# Patient Record
Sex: MALE | Race: BLACK OR AFRICAN AMERICAN | NOT HISPANIC OR LATINO
[De-identification: names, ages, dates, MRNs, and addresses within clinical notes are randomized per-mention and may not be internally consistent; named-entity substitution may affect disease eponyms.]

---

## 2017-01-13 ENCOUNTER — NON-APPOINTMENT (OUTPATIENT)
Age: 52
End: 2017-01-13

## 2017-01-13 ENCOUNTER — APPOINTMENT (OUTPATIENT)
Dept: INTERNAL MEDICINE | Facility: CLINIC | Age: 52
End: 2017-01-13

## 2017-01-13 VITALS
HEART RATE: 84 BPM | SYSTOLIC BLOOD PRESSURE: 110 MMHG | TEMPERATURE: 98.4 F | RESPIRATION RATE: 20 BRPM | DIASTOLIC BLOOD PRESSURE: 72 MMHG

## 2017-01-13 VITALS — BODY MASS INDEX: 35.89 KG/M2 | HEIGHT: 72 IN | WEIGHT: 265 LBS

## 2017-01-18 ENCOUNTER — OUTPATIENT (OUTPATIENT)
Dept: OUTPATIENT SERVICES | Facility: HOSPITAL | Age: 52
LOS: 1 days | Discharge: HOME | End: 2017-01-18

## 2017-01-18 VITALS
RESPIRATION RATE: 18 BRPM | WEIGHT: 265.44 LBS | SYSTOLIC BLOOD PRESSURE: 123 MMHG | TEMPERATURE: 98 F | HEART RATE: 84 BPM | DIASTOLIC BLOOD PRESSURE: 73 MMHG | OXYGEN SATURATION: 96 % | HEIGHT: 72 IN

## 2017-01-18 DIAGNOSIS — M17.11 UNILATERAL PRIMARY OSTEOARTHRITIS, RIGHT KNEE: ICD-10-CM

## 2017-01-18 DIAGNOSIS — Z01.818 ENCOUNTER FOR OTHER PREPROCEDURAL EXAMINATION: ICD-10-CM

## 2017-01-18 DIAGNOSIS — I48.91 UNSPECIFIED ATRIAL FIBRILLATION: ICD-10-CM

## 2017-01-18 DIAGNOSIS — M25.569 PAIN IN UNSPECIFIED KNEE: ICD-10-CM

## 2017-01-18 DIAGNOSIS — Z96.642 PRESENCE OF LEFT ARTIFICIAL HIP JOINT: Chronic | ICD-10-CM

## 2017-01-18 DIAGNOSIS — Z96.641 PRESENCE OF RIGHT ARTIFICIAL HIP JOINT: Chronic | ICD-10-CM

## 2017-01-18 DIAGNOSIS — Z98.89 OTHER SPECIFIED POSTPROCEDURAL STATES: Chronic | ICD-10-CM

## 2017-01-18 LAB
ANION GAP SERPL CALC-SCNC: 7 MMOL/L — SIGNIFICANT CHANGE UP (ref 5–17)
APTT BLD: 29.3 SEC — SIGNIFICANT CHANGE UP (ref 27.5–37.4)
BASOPHILS # BLD AUTO: 0 K/UL — SIGNIFICANT CHANGE UP (ref 0–0.2)
BASOPHILS NFR BLD AUTO: 0.5 % — SIGNIFICANT CHANGE UP (ref 0–2)
BUN SERPL-MCNC: 13 MG/DL — SIGNIFICANT CHANGE UP (ref 7–23)
CALCIUM SERPL-MCNC: 8.4 MG/DL — LOW (ref 8.5–10.1)
CHLORIDE SERPL-SCNC: 110 MMOL/L — HIGH (ref 96–108)
CO2 SERPL-SCNC: 28 MMOL/L — SIGNIFICANT CHANGE UP (ref 22–31)
CREAT SERPL-MCNC: 0.76 MG/DL — SIGNIFICANT CHANGE UP (ref 0.5–1.3)
EOSINOPHIL # BLD AUTO: 0 K/UL — SIGNIFICANT CHANGE UP (ref 0–0.5)
EOSINOPHIL NFR BLD AUTO: 1 % — SIGNIFICANT CHANGE UP (ref 0–6)
GLUCOSE SERPL-MCNC: 102 MG/DL — HIGH (ref 70–99)
HBA1C BLD-MCNC: 5 % — SIGNIFICANT CHANGE UP (ref 4–5.6)
HCT VFR BLD CALC: 40.4 % — SIGNIFICANT CHANGE UP (ref 39–50)
HGB BLD-MCNC: 13.8 G/DL — SIGNIFICANT CHANGE UP (ref 13–17)
INR BLD: 1.06 RATIO — SIGNIFICANT CHANGE UP (ref 0.88–1.16)
LYMPHOCYTES # BLD AUTO: 0.9 K/UL — LOW (ref 1–3.3)
LYMPHOCYTES # BLD AUTO: 24.4 % — SIGNIFICANT CHANGE UP (ref 13–44)
MCHC RBC-ENTMCNC: 27.6 PG — SIGNIFICANT CHANGE UP (ref 27–34)
MCHC RBC-ENTMCNC: 34.2 GM/DL — SIGNIFICANT CHANGE UP (ref 32–36)
MCV RBC AUTO: 80.7 FL — SIGNIFICANT CHANGE UP (ref 80–100)
MONOCYTES # BLD AUTO: 0.3 K/UL — SIGNIFICANT CHANGE UP (ref 0–0.9)
MONOCYTES NFR BLD AUTO: 8.2 % — SIGNIFICANT CHANGE UP (ref 2–14)
MRSA PCR RESULT.: SIGNIFICANT CHANGE UP
NEUTROPHILS # BLD AUTO: 2.5 K/UL — SIGNIFICANT CHANGE UP (ref 1.8–7.4)
NEUTROPHILS NFR BLD AUTO: 66 % — SIGNIFICANT CHANGE UP (ref 43–77)
PLATELET # BLD AUTO: 218 K/UL — SIGNIFICANT CHANGE UP (ref 150–400)
POTASSIUM SERPL-MCNC: 3.8 MMOL/L — SIGNIFICANT CHANGE UP (ref 3.5–5.3)
POTASSIUM SERPL-SCNC: 3.8 MMOL/L — SIGNIFICANT CHANGE UP (ref 3.5–5.3)
PROTHROM AB SERPL-ACNC: 11.9 SEC — SIGNIFICANT CHANGE UP (ref 10–13.1)
RBC # BLD: 5.01 M/UL — SIGNIFICANT CHANGE UP (ref 4.2–5.8)
RBC # FLD: 11.4 % — SIGNIFICANT CHANGE UP (ref 11–15)
S AUREUS DNA NOSE QL NAA+PROBE: SIGNIFICANT CHANGE UP
SODIUM SERPL-SCNC: 145 MMOL/L — SIGNIFICANT CHANGE UP (ref 135–145)
WBC # BLD: 3.7 K/UL — LOW (ref 3.8–10.5)
WBC # FLD AUTO: 3.7 K/UL — LOW (ref 3.8–10.5)

## 2017-01-18 RX ORDER — SODIUM CHLORIDE 9 MG/ML
3 INJECTION INTRAMUSCULAR; INTRAVENOUS; SUBCUTANEOUS EVERY 8 HOURS
Qty: 0 | Refills: 0 | Status: DISCONTINUED | OUTPATIENT
Start: 2017-01-30 | End: 2017-02-02

## 2017-01-18 NOTE — PATIENT PROFILE ADULT. - PSH
S/P arthroscopic knee surgery  Bilateral ( 10 years ago )  S/P hip replacement, left  September 2016  S/P hip replacement, right  May 2016

## 2017-01-18 NOTE — H&P PST ADULT - NSANTHOSAYNRD_GEN_A_CORE
No. SG screening performed.  STOP BANG Legend: 0-2 = LOW Risk; 3-4 = INTERMEDIATE Risk; 5-8 = HIGH Risk

## 2017-01-27 RX ORDER — OXYCODONE HYDROCHLORIDE 5 MG/1
20 TABLET ORAL ONCE
Qty: 0 | Refills: 0 | Status: DISCONTINUED | OUTPATIENT
Start: 2017-01-30 | End: 2017-02-02

## 2017-01-27 RX ORDER — ACETAMINOPHEN 500 MG
650 TABLET ORAL ONCE
Qty: 0 | Refills: 0 | Status: DISCONTINUED | OUTPATIENT
Start: 2017-01-30 | End: 2017-02-02

## 2017-01-27 RX ORDER — CELECOXIB 200 MG/1
200 CAPSULE ORAL ONCE
Qty: 0 | Refills: 0 | Status: DISCONTINUED | OUTPATIENT
Start: 2017-01-30 | End: 2017-02-02

## 2017-01-29 ENCOUNTER — RESULT REVIEW (OUTPATIENT)
Age: 52
End: 2017-01-29

## 2017-01-30 ENCOUNTER — INPATIENT (INPATIENT)
Facility: HOSPITAL | Age: 52
LOS: 2 days | Discharge: HOME | End: 2017-02-02
Attending: ORTHOPAEDIC SURGERY | Admitting: ORTHOPAEDIC SURGERY
Payer: MEDICAID

## 2017-01-30 ENCOUNTER — APPOINTMENT (OUTPATIENT)
Dept: ORTHOPEDIC SURGERY | Facility: HOSPITAL | Age: 52
End: 2017-01-30

## 2017-01-30 VITALS
HEART RATE: 87 BPM | WEIGHT: 264.55 LBS | RESPIRATION RATE: 18 BRPM | SYSTOLIC BLOOD PRESSURE: 150 MMHG | DIASTOLIC BLOOD PRESSURE: 81 MMHG | OXYGEN SATURATION: 98 % | TEMPERATURE: 98 F

## 2017-01-30 DIAGNOSIS — Z96.642 PRESENCE OF LEFT ARTIFICIAL HIP JOINT: Chronic | ICD-10-CM

## 2017-01-30 DIAGNOSIS — Z96.641 PRESENCE OF RIGHT ARTIFICIAL HIP JOINT: Chronic | ICD-10-CM

## 2017-01-30 DIAGNOSIS — Z98.89 OTHER SPECIFIED POSTPROCEDURAL STATES: Chronic | ICD-10-CM

## 2017-01-30 LAB
ANION GAP SERPL CALC-SCNC: 5 MMOL/L — SIGNIFICANT CHANGE UP (ref 5–17)
BUN SERPL-MCNC: 16 MG/DL — SIGNIFICANT CHANGE UP (ref 7–23)
CALCIUM SERPL-MCNC: 7.9 MG/DL — LOW (ref 8.5–10.1)
CHLORIDE SERPL-SCNC: 107 MMOL/L — SIGNIFICANT CHANGE UP (ref 96–108)
CO2 SERPL-SCNC: 32 MMOL/L — HIGH (ref 22–31)
CREAT SERPL-MCNC: 0.86 MG/DL — SIGNIFICANT CHANGE UP (ref 0.5–1.3)
GLUCOSE SERPL-MCNC: 96 MG/DL — SIGNIFICANT CHANGE UP (ref 70–99)
HCT VFR BLD CALC: 36.3 % — LOW (ref 39–50)
HGB BLD-MCNC: 12.5 G/DL — LOW (ref 13–17)
MCHC RBC-ENTMCNC: 28.8 PG — SIGNIFICANT CHANGE UP (ref 27–34)
MCHC RBC-ENTMCNC: 34.5 GM/DL — SIGNIFICANT CHANGE UP (ref 32–36)
MCV RBC AUTO: 83.5 FL — SIGNIFICANT CHANGE UP (ref 80–100)
PLATELET # BLD AUTO: 196 K/UL — SIGNIFICANT CHANGE UP (ref 150–400)
POTASSIUM SERPL-MCNC: 4.2 MMOL/L — SIGNIFICANT CHANGE UP (ref 3.5–5.3)
POTASSIUM SERPL-SCNC: 4.2 MMOL/L — SIGNIFICANT CHANGE UP (ref 3.5–5.3)
RBC # BLD: 4.35 M/UL — SIGNIFICANT CHANGE UP (ref 4.2–5.8)
RBC # FLD: 11.4 % — SIGNIFICANT CHANGE UP (ref 11–15)
SODIUM SERPL-SCNC: 144 MMOL/L — SIGNIFICANT CHANGE UP (ref 135–145)
WBC # BLD: 4 K/UL — SIGNIFICANT CHANGE UP (ref 3.8–10.5)
WBC # FLD AUTO: 4 K/UL — SIGNIFICANT CHANGE UP (ref 3.8–10.5)

## 2017-01-30 PROCEDURE — 27447 TOTAL KNEE ARTHROPLASTY: CPT | Mod: RT

## 2017-01-30 PROCEDURE — 88305 TISSUE EXAM BY PATHOLOGIST: CPT | Mod: 26

## 2017-01-30 PROCEDURE — 73560 X-RAY EXAM OF KNEE 1 OR 2: CPT | Mod: 26,RT

## 2017-01-30 PROCEDURE — 88311 DECALCIFY TISSUE: CPT | Mod: 26

## 2017-01-30 RX ORDER — SODIUM CHLORIDE 9 MG/ML
1000 INJECTION INTRAMUSCULAR; INTRAVENOUS; SUBCUTANEOUS
Qty: 0 | Refills: 0 | Status: DISCONTINUED | OUTPATIENT
Start: 2017-01-30 | End: 2017-02-02

## 2017-01-30 RX ORDER — OXYCODONE HYDROCHLORIDE 5 MG/1
10 TABLET ORAL EVERY 4 HOURS
Qty: 0 | Refills: 0 | Status: DISCONTINUED | OUTPATIENT
Start: 2017-01-30 | End: 2017-02-02

## 2017-01-30 RX ORDER — OXYCODONE HYDROCHLORIDE 5 MG/1
5 TABLET ORAL EVERY 4 HOURS
Qty: 0 | Refills: 0 | Status: DISCONTINUED | OUTPATIENT
Start: 2017-01-30 | End: 2017-02-02

## 2017-01-30 RX ORDER — ONDANSETRON 8 MG/1
4 TABLET, FILM COATED ORAL EVERY 6 HOURS
Qty: 0 | Refills: 0 | Status: DISCONTINUED | OUTPATIENT
Start: 2017-01-30 | End: 2017-02-02

## 2017-01-30 RX ORDER — CELECOXIB 200 MG/1
200 CAPSULE ORAL ONCE
Qty: 0 | Refills: 0 | Status: COMPLETED | OUTPATIENT
Start: 2017-01-30 | End: 2017-01-30

## 2017-01-30 RX ORDER — DIPHENHYDRAMINE HCL 50 MG
25 CAPSULE ORAL AT BEDTIME
Qty: 0 | Refills: 0 | Status: DISCONTINUED | OUTPATIENT
Start: 2017-01-30 | End: 2017-02-02

## 2017-01-30 RX ORDER — OXYCODONE HYDROCHLORIDE 5 MG/1
20 TABLET ORAL ONCE
Qty: 0 | Refills: 0 | Status: DISCONTINUED | OUTPATIENT
Start: 2017-01-30 | End: 2017-01-30

## 2017-01-30 RX ORDER — PANTOPRAZOLE SODIUM 20 MG/1
40 TABLET, DELAYED RELEASE ORAL DAILY
Qty: 0 | Refills: 0 | Status: DISCONTINUED | OUTPATIENT
Start: 2017-01-30 | End: 2017-02-02

## 2017-01-30 RX ORDER — ACETAMINOPHEN 500 MG
650 TABLET ORAL EVERY 6 HOURS
Qty: 0 | Refills: 0 | Status: DISCONTINUED | OUTPATIENT
Start: 2017-01-30 | End: 2017-02-02

## 2017-01-30 RX ORDER — SENNA PLUS 8.6 MG/1
2 TABLET ORAL AT BEDTIME
Qty: 0 | Refills: 0 | Status: DISCONTINUED | OUTPATIENT
Start: 2017-01-30 | End: 2017-02-02

## 2017-01-30 RX ORDER — PROCHLORPERAZINE MALEATE 5 MG
5 TABLET ORAL ONCE
Qty: 0 | Refills: 0 | Status: DISCONTINUED | OUTPATIENT
Start: 2017-01-30 | End: 2017-02-02

## 2017-01-30 RX ORDER — DOCUSATE SODIUM 100 MG
100 CAPSULE ORAL THREE TIMES A DAY
Qty: 0 | Refills: 0 | Status: DISCONTINUED | OUTPATIENT
Start: 2017-01-30 | End: 2017-02-02

## 2017-01-30 RX ORDER — POLYETHYLENE GLYCOL 3350 17 G/17G
17 POWDER, FOR SOLUTION ORAL DAILY
Qty: 0 | Refills: 0 | Status: DISCONTINUED | OUTPATIENT
Start: 2017-01-30 | End: 2017-02-02

## 2017-01-30 RX ORDER — SODIUM CHLORIDE 9 MG/ML
1000 INJECTION, SOLUTION INTRAVENOUS
Qty: 0 | Refills: 0 | Status: DISCONTINUED | OUTPATIENT
Start: 2017-01-30 | End: 2017-01-30

## 2017-01-30 RX ORDER — ACETAMINOPHEN 500 MG
650 TABLET ORAL ONCE
Qty: 0 | Refills: 0 | Status: COMPLETED | OUTPATIENT
Start: 2017-01-30 | End: 2017-01-30

## 2017-01-30 RX ORDER — MAGNESIUM HYDROXIDE 400 MG/1
30 TABLET, CHEWABLE ORAL DAILY
Qty: 0 | Refills: 0 | Status: DISCONTINUED | OUTPATIENT
Start: 2017-01-30 | End: 2017-02-02

## 2017-01-30 RX ORDER — RIVAROXABAN 15 MG-20MG
20 KIT ORAL DAILY
Qty: 0 | Refills: 0 | Status: DISCONTINUED | OUTPATIENT
Start: 2017-01-31 | End: 2017-02-02

## 2017-01-30 RX ORDER — HYDROMORPHONE HYDROCHLORIDE 2 MG/ML
0.5 INJECTION INTRAMUSCULAR; INTRAVENOUS; SUBCUTANEOUS
Qty: 0 | Refills: 0 | Status: DISCONTINUED | OUTPATIENT
Start: 2017-01-30 | End: 2017-02-02

## 2017-01-30 RX ORDER — CEFAZOLIN SODIUM 1 G
2000 VIAL (EA) INJECTION EVERY 8 HOURS
Qty: 0 | Refills: 0 | Status: COMPLETED | OUTPATIENT
Start: 2017-01-30 | End: 2017-01-31

## 2017-01-30 RX ORDER — MORPHINE SULFATE 50 MG/1
4 CAPSULE, EXTENDED RELEASE ORAL ONCE
Qty: 0 | Refills: 0 | Status: DISCONTINUED | OUTPATIENT
Start: 2017-01-30 | End: 2017-01-30

## 2017-01-30 RX ADMIN — SODIUM CHLORIDE 100 MILLILITER(S): 9 INJECTION INTRAMUSCULAR; INTRAVENOUS; SUBCUTANEOUS at 20:45

## 2017-01-30 RX ADMIN — Medication 100 MILLIGRAM(S): at 22:42

## 2017-01-30 RX ADMIN — Medication 1 TABLET(S): at 22:42

## 2017-01-30 RX ADMIN — MORPHINE SULFATE 4 MILLIGRAM(S): 50 CAPSULE, EXTENDED RELEASE ORAL at 19:00

## 2017-01-30 RX ADMIN — OXYCODONE HYDROCHLORIDE 20 MILLIGRAM(S): 5 TABLET ORAL at 10:46

## 2017-01-30 RX ADMIN — Medication 650 MILLIGRAM(S): at 10:45

## 2017-01-30 RX ADMIN — MORPHINE SULFATE 4 MILLIGRAM(S): 50 CAPSULE, EXTENDED RELEASE ORAL at 18:31

## 2017-01-30 RX ADMIN — Medication 100 MILLIGRAM(S): at 20:43

## 2017-01-30 RX ADMIN — OXYCODONE HYDROCHLORIDE 10 MILLIGRAM(S): 5 TABLET ORAL at 23:40

## 2017-01-30 RX ADMIN — CELECOXIB 200 MILLIGRAM(S): 200 CAPSULE ORAL at 10:44

## 2017-01-30 RX ADMIN — SODIUM CHLORIDE 75 MILLILITER(S): 9 INJECTION, SOLUTION INTRAVENOUS at 14:49

## 2017-01-30 RX ADMIN — OXYCODONE HYDROCHLORIDE 20 MILLIGRAM(S): 5 TABLET ORAL at 10:45

## 2017-01-30 RX ADMIN — OXYCODONE HYDROCHLORIDE 10 MILLIGRAM(S): 5 TABLET ORAL at 22:42

## 2017-01-30 RX ADMIN — CELECOXIB 200 MILLIGRAM(S): 200 CAPSULE ORAL at 10:46

## 2017-01-30 RX ADMIN — SODIUM CHLORIDE 3 MILLILITER(S): 9 INJECTION INTRAMUSCULAR; INTRAVENOUS; SUBCUTANEOUS at 22:38

## 2017-01-30 NOTE — DISCHARGE NOTE ADULT - CARE PLAN
Principal Discharge DX:	Knee osteoarthritis  Goal:	Reduce pain improve function  Instructions for follow-up, activity and diet:	1.	Pain Control  2.	Walking with full weight bearing as tolerated, with assistive devices (walker/Cane as Needed)  3.	DVT Prophylaxis for 30 days  4.	PT as needed  5.	Follow up with Dr. Hernandez as Outpatient in 10-14 Days after Discharge from the Hospital or Rehab. Call Office For Appointment.  6.	Remove Staples Post-Op Day 14, and Remove Dressing Post-Op Day 10, with Daily Dressing Changes as Need.  7.	Ice/Elevate affected area as Needed  8.	Keep Dressing Clean and dry. Principal Discharge DX:	Knee osteoarthritis  Goal:	Reduce pain improve function  Instructions for follow-up, activity and diet:	1.	Pain Control  2.	Walking with full weight bearing as tolerated, with assistive devices (walker/Cane as Needed)  3.	DVT Prophylaxis for 30 days  4.	PT as needed  5.	Follow up with Dr. Hernandez as Outpatient in 10-14 Days after Discharge from the Hospital or Rehab. Call Office For Appointment.  6.	Remove Staples Post-Op Day 14, and Remove Dressing Post-Op Day 10, with Daily Dressing Changes as Need.  7.	Ice/Elevate affected area as Needed  8.	Keep Dressing Clean and dry.  9.            F/u with Dr Kirkpatrick in 10-14 days

## 2017-01-30 NOTE — DISCHARGE NOTE ADULT - HOSPITAL COURSE
The patient is a 52 year old Male status post elective total knee Arthroplasty to the Right knee after failing outpatient nonoperative conservative management.  Patient presented to [facility] after being medically cleared for an elective surgical procedure. The patient was taken to the operating room on date mentioned above. Prophylactic antibiotics were started before the procedure and continued for 24 hours.  There were no complications during the procedure and patient tolerated the procedure well.  The patient was transferred to recovery room in stable condition and subsequently to surgical floor.  Patient was placed on Xarelto for anticoagulation.  All home medications were continued.  The patient received physical therapy daily and daily labs were followed.  The dressing was kept clean, dry, intact and changed on POD 3.  The rest of the hospital stay was unremarkable.

## 2017-01-30 NOTE — DISCHARGE NOTE ADULT - PLAN OF CARE
Reduce pain improve function 1.	Pain Control  2.	Walking with full weight bearing as tolerated, with assistive devices (walker/Cane as Needed)  3.	DVT Prophylaxis for 30 days  4.	PT as needed  5.	Follow up with Dr. Hernandez as Outpatient in 10-14 Days after Discharge from the Hospital or Rehab. Call Office For Appointment.  6.	Remove Staples Post-Op Day 14, and Remove Dressing Post-Op Day 10, with Daily Dressing Changes as Need.  7.	Ice/Elevate affected area as Needed  8.	Keep Dressing Clean and dry. 1.	Pain Control  2.	Walking with full weight bearing as tolerated, with assistive devices (walker/Cane as Needed)  3.	DVT Prophylaxis for 30 days  4.	PT as needed  5.	Follow up with Dr. Hernandez as Outpatient in 10-14 Days after Discharge from the Hospital or Rehab. Call Office For Appointment.  6.	Remove Staples Post-Op Day 14, and Remove Dressing Post-Op Day 10, with Daily Dressing Changes as Need.  7.	Ice/Elevate affected area as Needed  8.	Keep Dressing Clean and dry.  9.            F/u with Dr Kirkpatrick in 10-14 days

## 2017-01-30 NOTE — DISCHARGE NOTE ADULT - CARE PROVIDERS DIRECT ADDRESSES
,[email or web address] ([email or web address]

## 2017-01-30 NOTE — DISCHARGE NOTE ADULT - CARE PROVIDER_API CALL
Russ Hernandez), Orthopaedic Surgery  [address] [place] [address]  Phone: [phone]  Fax: [phone] Russ Hernandez), Orthopaedic Surgery  [address] [place] [address]  Phone: [phone]  Fax: [phone]    Matthew Kirkpatrick), Cardiology; Cardiovascular Disease  [address] [place] [address]  Phone: [phone]  Fax: [phone]

## 2017-01-30 NOTE — PROGRESS NOTE ADULT - SUBJECTIVE AND OBJECTIVE BOX
Pt S/E at bedside, patient reports numbness still from block in RLE.  AVSS  Gen: NAD, AAOx3    RLE:  Dressing clean dry intact  +EHL/FHL/TA/GS  Decreased sensation distal to knee  +DP/PT Pulses  Compartments soft  No calf TTP B/L

## 2017-01-30 NOTE — DISCHARGE NOTE ADULT - PATIENT PORTAL LINK FT
“You can access the Follow[employer] Patient Portal, offered by [facility], by registering with the following website: [email or web address]”

## 2017-01-30 NOTE — DISCHARGE NOTE ADULT - MEDICATION SUMMARY - MEDICATIONS TO TAKE
I will START or STAY ON the medications listed below when I get home from the hospital:    oxyCODONE-acetaminophen 5 mg-325 mg oral tablet  -- 1 tab(s) by mouth every 4 hours, As Needed -for severe pain MDD:6  -- Caution federal law prohibits the transfer of this drug to any person other  than the person for whom it was prescribed.  May cause drowsiness.  Alcohol may intensify this effect.  Use care when operating dangerous machinery.  This prescription cannot be refilled.  This product contains acetaminophen.  Do not use  with any other product containing acetaminophen to prevent possible liver damage.  Using more of this medication than prescribed may cause serious breathing problems.    -- Indication: For prn severe pain    Xarelto 20 mg oral tablet  -- 1 tab(s) by mouth once a day (in the evening)  -- Check with your doctor before becoming pregnant.  It is very important that you take or use this exactly as directed.  Do not skip doses or discontinue unless directed by your doctor.  Obtain medical advice before taking any non-prescription drugs as some may affect the action of this medication.  Take with food.    -- Indication: For home med/dvt ppx    Zofran 4 mg oral tablet  -- 1 tab(s) by mouth every 6 hours, As Needed for nausea.  -- Indication: For prn nausea/vommiting    Colace 100 mg oral capsule  -- 1 cap(s) by mouth 3 times a day, As Needed -for constipation MDD:3  -- Medication should be taken with plenty of water.    -- Indication: For prn constipation I will START or STAY ON the medications listed below when I get home from the hospital:    oxyCODONE-acetaminophen 5 mg-325 mg oral tablet  -- 1 tab(s) by mouth every 4 hours, As Needed -for severe pain MDD:6  -- Caution federal law prohibits the transfer of this drug to any person other  than the person for whom it was prescribed.  May cause drowsiness.  Alcohol may intensify this effect.  Use care when operating dangerous machinery.  This prescription cannot be refilled.  This product contains acetaminophen.  Do not use  with any other product containing acetaminophen to prevent possible liver damage.  Using more of this medication than prescribed may cause serious breathing problems.    -- Indication: For prn severe pain    Cardizem 30 mg oral tablet  -- 1 tab(s) by mouth every 6 hours  -- It is very important that you take or use this exactly as directed.  Do not skip doses or discontinue unless directed by your doctor.  Some non-prescription drugs may aggravate your condition.  Read all labels carefully.  If a warning appears, check with your doctor before taking.    -- Indication: For afib    Xarelto 20 mg oral tablet  -- 1 tab(s) by mouth once a day (in the evening)  -- Check with your doctor before becoming pregnant.  It is very important that you take or use this exactly as directed.  Do not skip doses or discontinue unless directed by your doctor.  Obtain medical advice before taking any non-prescription drugs as some may affect the action of this medication.  Take with food.    -- Indication: For home med/dvt ppx    Zofran 4 mg oral tablet  -- 1 tab(s) by mouth every 6 hours, As Needed for nausea.  -- Indication: For prn nausea/vommiting    Colace 100 mg oral capsule  -- 1 cap(s) by mouth 3 times a day, As Needed -for constipation MDD:3  -- Medication should be taken with plenty of water.    -- Indication: For prn constipation I will START or STAY ON the medications listed below when I get home from the hospital:    oxyCODONE-acetaminophen 5 mg-325 mg oral tablet  -- 1 tab(s) by mouth every 4 hours, As Needed -for severe pain MDD:6  -- Caution federal law prohibits the transfer of this drug to any person other  than the person for whom it was prescribed.  May cause drowsiness.  Alcohol may intensify this effect.  Use care when operating dangerous machinery.  This prescription cannot be refilled.  This product contains acetaminophen.  Do not use  with any other product containing acetaminophen to prevent possible liver damage.  Using more of this medication than prescribed may cause serious breathing problems.    -- Indication: For prn severe pain    Cardizem  mg/24 hours oral capsule, extended release  -- 1 cap(s) by mouth once a day MDD:1  -- It is very important that you take or use this exactly as directed.  Do not skip doses or discontinue unless directed by your doctor.  Some non-prescription drugs may aggravate your condition.  Read all labels carefully.  If a warning appears, check with your doctor before taking.  Swallow whole.  Do not crush.    -- Indication: For Per cardiology    Xarelto 20 mg oral tablet  -- 1 tab(s) by mouth once a day (in the evening)  -- Check with your doctor before becoming pregnant.  It is very important that you take or use this exactly as directed.  Do not skip doses or discontinue unless directed by your doctor.  Obtain medical advice before taking any non-prescription drugs as some may affect the action of this medication.  Take with food.    -- Indication: For home med/dvt ppx    Zofran 4 mg oral tablet  -- 1 tab(s) by mouth every 6 hours, As Needed for nausea.  -- Indication: For prn nausea/vommiting    Colace 100 mg oral capsule  -- 1 cap(s) by mouth 3 times a day, As Needed -for constipation MDD:3  -- Medication should be taken with plenty of water.    -- Indication: For prn constipation

## 2017-01-31 LAB
ANION GAP SERPL CALC-SCNC: 9 MMOL/L — SIGNIFICANT CHANGE UP (ref 5–17)
BUN SERPL-MCNC: 15 MG/DL — SIGNIFICANT CHANGE UP (ref 7–23)
CALCIUM SERPL-MCNC: 7.8 MG/DL — LOW (ref 8.5–10.1)
CHLORIDE SERPL-SCNC: 105 MMOL/L — SIGNIFICANT CHANGE UP (ref 96–108)
CO2 SERPL-SCNC: 27 MMOL/L — SIGNIFICANT CHANGE UP (ref 22–31)
CREAT SERPL-MCNC: 0.82 MG/DL — SIGNIFICANT CHANGE UP (ref 0.5–1.3)
GLUCOSE SERPL-MCNC: 142 MG/DL — HIGH (ref 70–99)
HCT VFR BLD CALC: 31.9 % — LOW (ref 39–50)
HGB BLD-MCNC: 11.1 G/DL — LOW (ref 13–17)
MCHC RBC-ENTMCNC: 28.7 PG — SIGNIFICANT CHANGE UP (ref 27–34)
MCHC RBC-ENTMCNC: 34.8 GM/DL — SIGNIFICANT CHANGE UP (ref 32–36)
MCV RBC AUTO: 82.6 FL — SIGNIFICANT CHANGE UP (ref 80–100)
PLATELET # BLD AUTO: 186 K/UL — SIGNIFICANT CHANGE UP (ref 150–400)
POTASSIUM SERPL-MCNC: 3.9 MMOL/L — SIGNIFICANT CHANGE UP (ref 3.5–5.3)
POTASSIUM SERPL-SCNC: 3.9 MMOL/L — SIGNIFICANT CHANGE UP (ref 3.5–5.3)
RBC # BLD: 3.86 M/UL — LOW (ref 4.2–5.8)
RBC # FLD: 11.3 % — SIGNIFICANT CHANGE UP (ref 11–15)
SODIUM SERPL-SCNC: 141 MMOL/L — SIGNIFICANT CHANGE UP (ref 135–145)
WBC # BLD: 6.1 K/UL — SIGNIFICANT CHANGE UP (ref 3.8–10.5)
WBC # FLD AUTO: 6.1 K/UL — SIGNIFICANT CHANGE UP (ref 3.8–10.5)

## 2017-01-31 RX ORDER — DOCUSATE SODIUM 100 MG
1 CAPSULE ORAL
Qty: 60 | Refills: 0 | OUTPATIENT
Start: 2017-01-31

## 2017-01-31 RX ORDER — ONDANSETRON 8 MG/1
1 TABLET, FILM COATED ORAL
Qty: 10 | Refills: 0 | OUTPATIENT
Start: 2017-01-31

## 2017-01-31 RX ADMIN — RIVAROXABAN 20 MILLIGRAM(S): KIT at 21:21

## 2017-01-31 RX ADMIN — Medication 650 MILLIGRAM(S): at 03:34

## 2017-01-31 RX ADMIN — SODIUM CHLORIDE 3 MILLILITER(S): 9 INJECTION INTRAMUSCULAR; INTRAVENOUS; SUBCUTANEOUS at 14:58

## 2017-01-31 RX ADMIN — SODIUM CHLORIDE 3 MILLILITER(S): 9 INJECTION INTRAMUSCULAR; INTRAVENOUS; SUBCUTANEOUS at 21:18

## 2017-01-31 RX ADMIN — PANTOPRAZOLE SODIUM 40 MILLIGRAM(S): 20 TABLET, DELAYED RELEASE ORAL at 12:40

## 2017-01-31 RX ADMIN — Medication 1 TABLET(S): at 06:21

## 2017-01-31 RX ADMIN — SODIUM CHLORIDE 3 MILLILITER(S): 9 INJECTION INTRAMUSCULAR; INTRAVENOUS; SUBCUTANEOUS at 06:19

## 2017-01-31 RX ADMIN — POLYETHYLENE GLYCOL 3350 17 GRAM(S): 17 POWDER, FOR SOLUTION ORAL at 12:37

## 2017-01-31 RX ADMIN — Medication 1 TABLET(S): at 21:21

## 2017-01-31 RX ADMIN — OXYCODONE HYDROCHLORIDE 10 MILLIGRAM(S): 5 TABLET ORAL at 08:58

## 2017-01-31 RX ADMIN — OXYCODONE HYDROCHLORIDE 10 MILLIGRAM(S): 5 TABLET ORAL at 13:40

## 2017-01-31 RX ADMIN — OXYCODONE HYDROCHLORIDE 10 MILLIGRAM(S): 5 TABLET ORAL at 03:31

## 2017-01-31 RX ADMIN — HYDROMORPHONE HYDROCHLORIDE 0.5 MILLIGRAM(S): 2 INJECTION INTRAMUSCULAR; INTRAVENOUS; SUBCUTANEOUS at 20:20

## 2017-01-31 RX ADMIN — SODIUM CHLORIDE 100 MILLILITER(S): 9 INJECTION INTRAMUSCULAR; INTRAVENOUS; SUBCUTANEOUS at 20:07

## 2017-01-31 RX ADMIN — OXYCODONE HYDROCHLORIDE 10 MILLIGRAM(S): 5 TABLET ORAL at 12:41

## 2017-01-31 RX ADMIN — Medication 100 MILLIGRAM(S): at 06:21

## 2017-01-31 RX ADMIN — Medication 100 MILLIGRAM(S): at 03:04

## 2017-01-31 RX ADMIN — Medication 100 MILLIGRAM(S): at 21:21

## 2017-01-31 RX ADMIN — Medication 1 TABLET(S): at 14:57

## 2017-01-31 RX ADMIN — Medication 100 MILLIGRAM(S): at 14:58

## 2017-01-31 RX ADMIN — OXYCODONE HYDROCHLORIDE 10 MILLIGRAM(S): 5 TABLET ORAL at 03:10

## 2017-01-31 RX ADMIN — HYDROMORPHONE HYDROCHLORIDE 0.5 MILLIGRAM(S): 2 INJECTION INTRAMUSCULAR; INTRAVENOUS; SUBCUTANEOUS at 20:05

## 2017-01-31 RX ADMIN — OXYCODONE HYDROCHLORIDE 10 MILLIGRAM(S): 5 TABLET ORAL at 08:00

## 2017-01-31 NOTE — OCCUPATIONAL THERAPY INITIAL EVALUATION ADULT - MODIFIED CLINICAL TEST OF SENSORY INTEGRATION IN BALANCE TEST
Barthel Index: Feeding Score___10___, Bathing Score___0___, Grooming Score__5___, Dressing Score__5___, Bowel Score__10___, Bladder Score___10___, Toilet Score___5__, Transfer Score___10___, Mobility Score__0___, Stairs Score__0___, Total Score__55___.

## 2017-01-31 NOTE — OCCUPATIONAL THERAPY INITIAL EVALUATION ADULT - TRANSFER SAFETY CONCERNS NOTED: TOILET, REHAB EVAL
decreased safety awareness/decreased sequencing ability/inability to maintain weight-bearing restrictions w/o assist/decreased weight-shifting ability/decreased step length

## 2017-01-31 NOTE — OCCUPATIONAL THERAPY INITIAL EVALUATION ADULT - ADDITIONAL COMMENTS
Pt lives in a private house with 5 steps to enter and ralings on 1 side. Once inside, the patient has 6 steps with 1 railing to reach main bedroom and bathroom. The bathroom is a tub/shower combination with a grab bar inside the tub/shower area. Prior to hospitalization, the patient was ambulating without a device and was  driving. The patient is able to state wants and needs.

## 2017-01-31 NOTE — PHYSICAL THERAPY INITIAL EVALUATION ADULT - CRITERIA FOR SKILLED THERAPEUTIC INTERVENTIONS
impairments found/functional limitations in following categories/predicted duration of therapy intervention/anticipated discharge recommendation/rehab potential/risk reduction/prevention/therapy frequency/Home with Home P.T.

## 2017-01-31 NOTE — PHYSICAL THERAPY INITIAL EVALUATION ADULT - MODIFIED CLINICAL TEST OF SENSORY INTEGRATION IN BALANCE TEST
Barthel Index: Feeding Score _10__, Bathing Score _0__, Grooming Score _0__, Dressing Score _5__, Bowels Score _10__, Bladder Score _10__, Toilet Score _10__, Transfers Score _10__, Mobility Score _0__, Stairs Score _0__,     Total Score _55__

## 2017-01-31 NOTE — PHYSICAL THERAPY INITIAL EVALUATION ADULT - TRANSFER SAFETY CONCERNS NOTED: SIT/STAND, REHAB EVAL
decreased balance during turns/decreased weight-shifting ability/decreased step length/decreased sequencing ability

## 2017-01-31 NOTE — PROGRESS NOTE ADULT - SUBJECTIVE AND OBJECTIVE BOX
Pt S/E at bedside, no acute events overnight, pain controlled    AVSS  Gen: NAD, AAOx3    RLE:  Dressing clean dry intact  +EHL/FHL/TA/GS  SILT L3-S1  +DP/PT Pulses  Compartments soft  No calf TTP B/L

## 2017-01-31 NOTE — PROGRESS NOTE ADULT - ATTENDING COMMENTS
patient seen and examined. agree with resident assessment and plan. pod#1 s/p tka. pt/ot wbat, dvtp xarelto.  pain control. abx x 24h.  dispo planning for home tomorrow v thurs

## 2017-01-31 NOTE — OCCUPATIONAL THERAPY INITIAL EVALUATION ADULT - TRANSFER SAFETY CONCERNS NOTED: SIT/STAND, REHAB EVAL
decreased sequencing ability/decreased weight-shifting ability/decreased step length/inability to maintain weight-bearing restrictions w/o assist/decreased safety awareness

## 2017-01-31 NOTE — PHYSICAL THERAPY INITIAL EVALUATION ADULT - GAIT DEVIATIONS NOTED, PT EVAL
increased stride width/decreased weight-shifting ability/decreased arnel/decreased stride length/decreased step length

## 2017-01-31 NOTE — PHYSICAL THERAPY INITIAL EVALUATION ADULT - TRANSFER SAFETY CONCERNS NOTED: BED/CHAIR, REHAB EVAL
decreased step length/decreased sequencing ability/decreased balance during turns/decreased weight-shifting ability

## 2017-01-31 NOTE — OCCUPATIONAL THERAPY INITIAL EVALUATION ADULT - GENERAL OBSERVATIONS, REHAB EVAL
Pt was encountered OOB in chair with NAD, L knee dressing C/D/I, AXOX4, cooperative, required verbal cues for hand/foot placement during tasks due to decreased safety awareness, followed commands; pt c/o pain in R knee due to s/p R TKR/THR which limits pt performance with functional ADL's/transfers and mobility.

## 2017-01-31 NOTE — OCCUPATIONAL THERAPY INITIAL EVALUATION ADULT - RANGE OF MOTION EXAMINATION, LOWER EXTREMITY
Left LE Active ROM was WFL (within functional limits)/Right LE Passive ROM was WNL (within normal limits)/RLE hip flexion sitting at edge of bed AROM 0- 90. RLE knee flexion sitting at edge AROM 0-65, RLE AROM distally to knee WFL.

## 2017-02-01 DIAGNOSIS — R07.89 OTHER CHEST PAIN: ICD-10-CM

## 2017-02-01 DIAGNOSIS — I48.2 CHRONIC ATRIAL FIBRILLATION: ICD-10-CM

## 2017-02-01 DIAGNOSIS — M17.11 UNILATERAL PRIMARY OSTEOARTHRITIS, RIGHT KNEE: ICD-10-CM

## 2017-02-01 LAB
ALBUMIN SERPL ELPH-MCNC: 2.7 G/DL — LOW (ref 3.3–5)
ALP SERPL-CCNC: 109 U/L — SIGNIFICANT CHANGE UP (ref 40–120)
ALT FLD-CCNC: 19 U/L — SIGNIFICANT CHANGE UP (ref 12–78)
ANION GAP SERPL CALC-SCNC: 6 MMOL/L — SIGNIFICANT CHANGE UP (ref 5–17)
AST SERPL-CCNC: 13 U/L — LOW (ref 15–37)
BILIRUB SERPL-MCNC: 1.5 MG/DL — HIGH (ref 0.2–1.2)
BUN SERPL-MCNC: 9 MG/DL — SIGNIFICANT CHANGE UP (ref 7–23)
CALCIUM SERPL-MCNC: 8.1 MG/DL — LOW (ref 8.5–10.1)
CHLORIDE SERPL-SCNC: 104 MMOL/L — SIGNIFICANT CHANGE UP (ref 96–108)
CK MB CFR SERPL CALC: <0.5 NG/ML — SIGNIFICANT CHANGE UP (ref 0.5–3.6)
CO2 SERPL-SCNC: 30 MMOL/L — SIGNIFICANT CHANGE UP (ref 22–31)
CREAT SERPL-MCNC: 0.86 MG/DL — SIGNIFICANT CHANGE UP (ref 0.5–1.3)
GLUCOSE SERPL-MCNC: 116 MG/DL — HIGH (ref 70–99)
HCT VFR BLD CALC: 30.4 % — LOW (ref 39–50)
HGB BLD-MCNC: 10.5 G/DL — LOW (ref 13–17)
MCHC RBC-ENTMCNC: 28.4 PG — SIGNIFICANT CHANGE UP (ref 27–34)
MCHC RBC-ENTMCNC: 34.6 GM/DL — SIGNIFICANT CHANGE UP (ref 32–36)
MCV RBC AUTO: 82.2 FL — SIGNIFICANT CHANGE UP (ref 80–100)
PLATELET # BLD AUTO: 174 K/UL — SIGNIFICANT CHANGE UP (ref 150–400)
POTASSIUM SERPL-MCNC: 3.7 MMOL/L — SIGNIFICANT CHANGE UP (ref 3.5–5.3)
POTASSIUM SERPL-SCNC: 3.7 MMOL/L — SIGNIFICANT CHANGE UP (ref 3.5–5.3)
PROT SERPL-MCNC: 6.8 GM/DL — SIGNIFICANT CHANGE UP (ref 6–8.3)
RBC # BLD: 3.7 M/UL — LOW (ref 4.2–5.8)
RBC # FLD: 10.8 % — LOW (ref 11–15)
SODIUM SERPL-SCNC: 140 MMOL/L — SIGNIFICANT CHANGE UP (ref 135–145)
SURGICAL PATHOLOGY FINAL REPORT - CH: SIGNIFICANT CHANGE UP
TROPONIN I SERPL-MCNC: <.015 NG/ML — SIGNIFICANT CHANGE UP (ref 0.01–0.04)
TROPONIN I SERPL-MCNC: <.015 NG/ML — SIGNIFICANT CHANGE UP (ref 0.01–0.04)
WBC # BLD: 10.3 K/UL — SIGNIFICANT CHANGE UP (ref 3.8–10.5)
WBC # FLD AUTO: 10.3 K/UL — SIGNIFICANT CHANGE UP (ref 3.8–10.5)

## 2017-02-01 PROCEDURE — 99233 SBSQ HOSP IP/OBS HIGH 50: CPT

## 2017-02-01 PROCEDURE — 99223 1ST HOSP IP/OBS HIGH 75: CPT

## 2017-02-01 PROCEDURE — 93970 EXTREMITY STUDY: CPT | Mod: 26

## 2017-02-01 PROCEDURE — 99291 CRITICAL CARE FIRST HOUR: CPT

## 2017-02-01 RX ORDER — POTASSIUM CHLORIDE 20 MEQ
40 PACKET (EA) ORAL ONCE
Qty: 0 | Refills: 0 | Status: COMPLETED | OUTPATIENT
Start: 2017-02-01 | End: 2017-02-01

## 2017-02-01 RX ORDER — MAGNESIUM SULFATE 500 MG/ML
2 VIAL (ML) INJECTION ONCE
Qty: 0 | Refills: 0 | Status: COMPLETED | OUTPATIENT
Start: 2017-02-01 | End: 2017-02-01

## 2017-02-01 RX ORDER — NITROGLYCERIN 6.5 MG
0.3 CAPSULE, EXTENDED RELEASE ORAL
Qty: 0 | Refills: 0 | Status: DISCONTINUED | OUTPATIENT
Start: 2017-02-01 | End: 2017-02-02

## 2017-02-01 RX ADMIN — Medication 1 TABLET(S): at 05:59

## 2017-02-01 RX ADMIN — Medication 100 MILLIGRAM(S): at 05:59

## 2017-02-01 RX ADMIN — OXYCODONE HYDROCHLORIDE 10 MILLIGRAM(S): 5 TABLET ORAL at 06:55

## 2017-02-01 RX ADMIN — Medication 100 MILLIGRAM(S): at 22:22

## 2017-02-01 RX ADMIN — OXYCODONE HYDROCHLORIDE 10 MILLIGRAM(S): 5 TABLET ORAL at 16:46

## 2017-02-01 RX ADMIN — SODIUM CHLORIDE 3 MILLILITER(S): 9 INJECTION INTRAMUSCULAR; INTRAVENOUS; SUBCUTANEOUS at 22:23

## 2017-02-01 RX ADMIN — OXYCODONE HYDROCHLORIDE 10 MILLIGRAM(S): 5 TABLET ORAL at 20:48

## 2017-02-01 RX ADMIN — HYDROMORPHONE HYDROCHLORIDE 0.5 MILLIGRAM(S): 2 INJECTION INTRAMUSCULAR; INTRAVENOUS; SUBCUTANEOUS at 03:34

## 2017-02-01 RX ADMIN — OXYCODONE HYDROCHLORIDE 10 MILLIGRAM(S): 5 TABLET ORAL at 07:55

## 2017-02-01 RX ADMIN — SODIUM CHLORIDE 3 MILLILITER(S): 9 INJECTION INTRAMUSCULAR; INTRAVENOUS; SUBCUTANEOUS at 13:33

## 2017-02-01 RX ADMIN — Medication 40 MILLIEQUIVALENT(S): at 14:46

## 2017-02-01 RX ADMIN — SODIUM CHLORIDE 100 MILLILITER(S): 9 INJECTION INTRAMUSCULAR; INTRAVENOUS; SUBCUTANEOUS at 06:00

## 2017-02-01 RX ADMIN — Medication 50 GRAM(S): at 14:46

## 2017-02-01 RX ADMIN — RIVAROXABAN 20 MILLIGRAM(S): KIT at 20:48

## 2017-02-01 RX ADMIN — HYDROMORPHONE HYDROCHLORIDE 0.5 MILLIGRAM(S): 2 INJECTION INTRAMUSCULAR; INTRAVENOUS; SUBCUTANEOUS at 03:19

## 2017-02-01 RX ADMIN — SODIUM CHLORIDE 3 MILLILITER(S): 9 INJECTION INTRAMUSCULAR; INTRAVENOUS; SUBCUTANEOUS at 05:59

## 2017-02-01 RX ADMIN — OXYCODONE HYDROCHLORIDE 10 MILLIGRAM(S): 5 TABLET ORAL at 21:45

## 2017-02-01 RX ADMIN — Medication 1 TABLET(S): at 22:22

## 2017-02-01 RX ADMIN — OXYCODONE HYDROCHLORIDE 10 MILLIGRAM(S): 5 TABLET ORAL at 16:02

## 2017-02-01 NOTE — PROGRESS NOTE ADULT - SUBJECTIVE AND OBJECTIVE BOX
Pt S&E. Pain controlled. No acute events overnight    AVSS    Gen: NAD    RLE:  Dsg c/d/i  SILT L2-S1  +EHL/FHL/TA/Gastroc  DP+  Soft compartments, - calf ttp

## 2017-02-01 NOTE — CONSULT NOTE ADULT - PROBLEM SELECTOR RECOMMENDATION 9
Serial CE's, ecg's. pain meds as needed.
1. EKG reviewed   2.Check troponin, cbc, chemistry   3. Cardiology consult Dr Taylor  called

## 2017-02-01 NOTE — CONSULT NOTE ADULT - ASSESSMENT
51 y/o male with atypical and likely non-cardiac left sided chest pain   EKG with no acute evidence of MI.   Chest wall pain is most likely and is musculoskeletal in nature vs nerve trauma. No hypoxemia , doubt PE.

## 2017-02-01 NOTE — CONSULT NOTE ADULT - ASSESSMENT
53 y/o male with leftsided chest pain now resolved consult for medical management   EKG with no acute evidence of MI, given current anticoagulation no shortness of breath, doubt PE   Chest wall pain is most likely and is muculoskeletal in nature

## 2017-02-01 NOTE — CHART NOTE - NSCHARTNOTEFT_GEN_A_CORE
Rapid Response Note  RRT called overhead, MICU team responded including myself (Jj Oconnell, DEBBIE) and Dr Black.   On arrival pt noted to be in bed, diaphoretic, in mild distress, c/o 2/10 chest pain. Denies palpitations, SOB, syncope, N/V, fever/chills, other c/o.  Pt reported to be ambulating with PT (s/p R TKR from 1/30) when noted to be tachycardic (to 200s) by nursing staff. Pt returned to bed by staff, RRT called, see flowsheet for VS. On tele and 12 lead EKG pt in a-fib with RVR.   Diltiazem 25mg slow IVP x 1 given with reduction in rate from 160-180s to 90s while rhythm remained a-fib. Recommended to pt's primary team to start diltiazem 30mg PO Q6H (and to make pt's cardiologist aware of recent events and our treatment/recommendations), continue to trend BMP/electrolytes and cardiac enzymes.  Labs reviewed, potassium 40 meq po x 1 and magnesium 2 grams IV x 1 ordered.  Once rate improved pt noted to be more comfortable and pain resolved. Pt stable, RRT ended after no objections reported by patient or RN/medical team present. Rapid Response Note  RRT called overhead, MICU team responded including myself (Jj Oconnell, DEBBIE) and Dr Black.   On arrival pt noted to be in bed, diaphoretic, in mild distress, c/o 2/10 chest pain. Denies palpitations, SOB, syncope, N/V, fever/chills, other c/o.  Pt c/o left CP described as tingling this am per team. EKG without acute changes at that time and first set of cardiac enzymes were negative.  Prior to RRT pt reported to be ambulating with PT (s/p R TKR from 1/30) when noted to be tachycardic (to 200s) by nursing staff. Pt returned to bed by staff, RRT called.  see flowsheet for VS.  On tele and 12 lead EKG pt in a-fib with RVR.   Diltiazem 25mg slow IVP x 1 given with reduction in rate from 160-180s to 90s while rhythm remained a-fib.   Once rate improved pt noted to be more comfortable and pain resolved. Pt stable, RRT ended after no objections reported by patient or RN/medical team present.    Assessment/plan: 52 year old male with PMH a-fib (on xarelto but no rate/rhythm control agents prior to admission), hay fever, heart murmur, obesity (BMI 30-39.9), s/p bilat arthroscopic knee surgery (10 years ago), s/p left THR (9/16) and right THR (5/16).     s/p R Total Knee Arthroplasty on 1/30. Further plan of care per ortho.   A-fib with RVR (now resolved after treated with diltiazem 25 mg IV x 1)  addition of ditiazem 30mg PO Q6H to medication regimen recommended to primary team (recommended to primary team to make pt's cardiologist aware of recent events and our treatment/recommendations)  continue xarelto  potassium 40 meq PO x 1 given for relative hypokalemia (would treat to keep K+ 4-4.5)  treated with magnesium 2 grams IV x 1 empirically  continue to trend BMP/electroytes and cardiac enzymes. Rapid Response Note  RRT called overhead, MICU team responded including myself (Jj Oconnell, DEBBIE) and Dr Black.   On arrival pt noted to be in bed, diaphoretic, in mild distress, c/o 2/10 chest pain. Denies palpitations, SOB, syncope, N/V, fever/chills, other c/o.  Pt c/o left CP described as tingling this am per team. EKG without acute changes at that time and first set of cardiac enzymes were negative.  Prior to RRT pt reported to be ambulating with PT (s/p R TKR from 1/30) when noted to be tachycardic (to 200s) by nursing staff. Pt returned to bed by staff, RRT called.    see flowsheet for VS.  On tele and 12 lead EKG pt in a-fib with RVR, SVT up to 180s    No improvement with valsalva maneuver. Pt awake and alert throughout event.     Diltiazem 25mg slow IVP x 1 given with reduction in rate from 160-180s to 90s while rhythm remained a-fib.   Once rate improved pt noted to be more comfortable and pain resolved. Pt stable, RRT ended after no objections reported by patient or RN/medical team present.    Assessment/plan: 52 year old male with PMH a-fib (on xarelto but no rate/rhythm control agents prior to admission), hay fever, heart murmur, obesity (BMI 30-39.9), s/p bilat arthroscopic knee surgery (10 years ago), s/p left THR (9/16) and right THR (5/16).     Dx SVT, a-fib RVR  s/p R Total Knee Arthroplasty on 1/30. Further plan of care per ortho.   A-fib with RVR (now resolved after treated with diltiazem 25 mg IV x 1)  addition of ditiazem 30mg PO Q6H to medication regimen recommended to primary team (recommended to primary team to make pt's cardiologist aware of recent events and our treatment/recommendations)  continue xarelto  potassium 40 meq PO x 1 given for relative hypokalemia (would treat to keep K+ 4-4.5)  treated with magnesium 2 grams IV x 1 empirically  continue to trend BMP/electroytes and cardiac enzymes.  cont telemetry monitoring    Critical Care Time: 35 min

## 2017-02-02 VITALS
HEART RATE: 97 BPM | OXYGEN SATURATION: 98 % | DIASTOLIC BLOOD PRESSURE: 66 MMHG | SYSTOLIC BLOOD PRESSURE: 127 MMHG | RESPIRATION RATE: 18 BRPM

## 2017-02-02 LAB
ANION GAP SERPL CALC-SCNC: 6 MMOL/L — SIGNIFICANT CHANGE UP (ref 5–17)
BUN SERPL-MCNC: 10 MG/DL — SIGNIFICANT CHANGE UP (ref 7–23)
CALCIUM SERPL-MCNC: 8 MG/DL — LOW (ref 8.5–10.1)
CHLORIDE SERPL-SCNC: 101 MMOL/L — SIGNIFICANT CHANGE UP (ref 96–108)
CO2 SERPL-SCNC: 31 MMOL/L — SIGNIFICANT CHANGE UP (ref 22–31)
CREAT SERPL-MCNC: 0.82 MG/DL — SIGNIFICANT CHANGE UP (ref 0.5–1.3)
GLUCOSE SERPL-MCNC: 124 MG/DL — HIGH (ref 70–99)
HCT VFR BLD CALC: 29 % — LOW (ref 39–50)
HGB BLD-MCNC: 10.1 G/DL — LOW (ref 13–17)
MCHC RBC-ENTMCNC: 29 PG — SIGNIFICANT CHANGE UP (ref 27–34)
MCHC RBC-ENTMCNC: 34.8 GM/DL — SIGNIFICANT CHANGE UP (ref 32–36)
MCV RBC AUTO: 83.2 FL — SIGNIFICANT CHANGE UP (ref 80–100)
PLATELET # BLD AUTO: 176 K/UL — SIGNIFICANT CHANGE UP (ref 150–400)
POTASSIUM SERPL-MCNC: 3.8 MMOL/L — SIGNIFICANT CHANGE UP (ref 3.5–5.3)
POTASSIUM SERPL-SCNC: 3.8 MMOL/L — SIGNIFICANT CHANGE UP (ref 3.5–5.3)
RBC # BLD: 3.49 M/UL — LOW (ref 4.2–5.8)
RBC # FLD: 11.3 % — SIGNIFICANT CHANGE UP (ref 11–15)
SODIUM SERPL-SCNC: 138 MMOL/L — SIGNIFICANT CHANGE UP (ref 135–145)
TROPONIN I SERPL-MCNC: <.015 NG/ML — SIGNIFICANT CHANGE UP (ref 0.01–0.04)
WBC # BLD: 10.3 K/UL — SIGNIFICANT CHANGE UP (ref 3.8–10.5)
WBC # FLD AUTO: 10.3 K/UL — SIGNIFICANT CHANGE UP (ref 3.8–10.5)

## 2017-02-02 RX ADMIN — POLYETHYLENE GLYCOL 3350 17 GRAM(S): 17 POWDER, FOR SOLUTION ORAL at 11:17

## 2017-02-02 RX ADMIN — SODIUM CHLORIDE 3 MILLILITER(S): 9 INJECTION INTRAMUSCULAR; INTRAVENOUS; SUBCUTANEOUS at 05:38

## 2017-02-02 RX ADMIN — Medication 1 TABLET(S): at 05:37

## 2017-02-02 RX ADMIN — OXYCODONE HYDROCHLORIDE 10 MILLIGRAM(S): 5 TABLET ORAL at 15:21

## 2017-02-02 RX ADMIN — SODIUM CHLORIDE 3 MILLILITER(S): 9 INJECTION INTRAMUSCULAR; INTRAVENOUS; SUBCUTANEOUS at 13:11

## 2017-02-02 RX ADMIN — OXYCODONE HYDROCHLORIDE 10 MILLIGRAM(S): 5 TABLET ORAL at 01:29

## 2017-02-02 RX ADMIN — Medication 1 TABLET(S): at 13:12

## 2017-02-02 RX ADMIN — OXYCODONE HYDROCHLORIDE 10 MILLIGRAM(S): 5 TABLET ORAL at 09:03

## 2017-02-02 RX ADMIN — Medication 100 MILLIGRAM(S): at 05:37

## 2017-02-02 RX ADMIN — PANTOPRAZOLE SODIUM 40 MILLIGRAM(S): 20 TABLET, DELAYED RELEASE ORAL at 11:17

## 2017-02-02 RX ADMIN — OXYCODONE HYDROCHLORIDE 10 MILLIGRAM(S): 5 TABLET ORAL at 10:03

## 2017-02-02 RX ADMIN — Medication 100 MILLIGRAM(S): at 13:12

## 2017-02-02 RX ADMIN — OXYCODONE HYDROCHLORIDE 10 MILLIGRAM(S): 5 TABLET ORAL at 02:20

## 2017-02-02 RX ADMIN — OXYCODONE HYDROCHLORIDE 10 MILLIGRAM(S): 5 TABLET ORAL at 16:22

## 2017-02-02 NOTE — PROGRESS NOTE ADULT - ATTENDING COMMENTS
agree with resident assessment and plan. pod 3 s/p tka.  trop/duplex neg.  med/cards recs appreciated.  dispo home today

## 2017-02-06 DIAGNOSIS — I48.2 CHRONIC ATRIAL FIBRILLATION: ICD-10-CM

## 2017-02-06 DIAGNOSIS — M17.11 UNILATERAL PRIMARY OSTEOARTHRITIS, RIGHT KNEE: ICD-10-CM

## 2017-02-10 ENCOUNTER — APPOINTMENT (OUTPATIENT)
Dept: ORTHOPEDIC SURGERY | Facility: CLINIC | Age: 52
End: 2017-02-10

## 2017-03-17 ENCOUNTER — APPOINTMENT (OUTPATIENT)
Dept: ORTHOPEDIC SURGERY | Facility: CLINIC | Age: 52
End: 2017-03-17

## 2017-04-27 ENCOUNTER — APPOINTMENT (OUTPATIENT)
Dept: INTERNAL MEDICINE | Facility: CLINIC | Age: 52
End: 2017-04-27

## 2017-04-27 VITALS — WEIGHT: 269 LBS | HEIGHT: 72 IN | BODY MASS INDEX: 36.44 KG/M2

## 2017-04-27 VITALS
HEART RATE: 76 BPM | OXYGEN SATURATION: 97 % | DIASTOLIC BLOOD PRESSURE: 76 MMHG | SYSTOLIC BLOOD PRESSURE: 112 MMHG | RESPIRATION RATE: 16 BRPM

## 2017-04-28 ENCOUNTER — APPOINTMENT (OUTPATIENT)
Dept: ORTHOPEDIC SURGERY | Facility: CLINIC | Age: 52
End: 2017-04-28

## 2017-05-01 ENCOUNTER — OUTPATIENT (OUTPATIENT)
Dept: OUTPATIENT SERVICES | Facility: HOSPITAL | Age: 52
LOS: 1 days | Discharge: HOME | End: 2017-05-01

## 2017-05-01 VITALS
DIASTOLIC BLOOD PRESSURE: 79 MMHG | HEART RATE: 66 BPM | SYSTOLIC BLOOD PRESSURE: 130 MMHG | HEIGHT: 72 IN | WEIGHT: 268.96 LBS | OXYGEN SATURATION: 97 % | RESPIRATION RATE: 18 BRPM | TEMPERATURE: 98 F

## 2017-05-01 DIAGNOSIS — Z96.641 PRESENCE OF RIGHT ARTIFICIAL HIP JOINT: Chronic | ICD-10-CM

## 2017-05-01 DIAGNOSIS — Z98.89 OTHER SPECIFIED POSTPROCEDURAL STATES: Chronic | ICD-10-CM

## 2017-05-01 DIAGNOSIS — M25.569 PAIN IN UNSPECIFIED KNEE: ICD-10-CM

## 2017-05-01 DIAGNOSIS — Z96.642 PRESENCE OF LEFT ARTIFICIAL HIP JOINT: Chronic | ICD-10-CM

## 2017-05-01 DIAGNOSIS — Z01.818 ENCOUNTER FOR OTHER PREPROCEDURAL EXAMINATION: ICD-10-CM

## 2017-05-01 DIAGNOSIS — Z96.659 PRESENCE OF UNSPECIFIED ARTIFICIAL KNEE JOINT: Chronic | ICD-10-CM

## 2017-05-01 DIAGNOSIS — M17.12 UNILATERAL PRIMARY OSTEOARTHRITIS, LEFT KNEE: ICD-10-CM

## 2017-05-01 DIAGNOSIS — I48.91 UNSPECIFIED ATRIAL FIBRILLATION: ICD-10-CM

## 2017-05-01 DIAGNOSIS — R01.1 CARDIAC MURMUR, UNSPECIFIED: ICD-10-CM

## 2017-05-01 DIAGNOSIS — E66.9 OBESITY, UNSPECIFIED: ICD-10-CM

## 2017-05-01 DIAGNOSIS — J30.1 ALLERGIC RHINITIS DUE TO POLLEN: ICD-10-CM

## 2017-05-01 NOTE — H&P PST ADULT - PSH
S/P arthroscopic knee surgery  Bilateral ( 10 years ago )  S/P hip replacement, left  September 2016  S/P hip replacement, right  May 2016  S/P knee replacement  Right ( 1/31/2017 )

## 2017-05-02 ENCOUNTER — RESULT REVIEW (OUTPATIENT)
Age: 52
End: 2017-05-02

## 2017-05-02 LAB
MRSA PCR RESULT.: SIGNIFICANT CHANGE UP
S AUREUS DNA NOSE QL NAA+PROBE: SIGNIFICANT CHANGE UP

## 2017-05-15 ENCOUNTER — INPATIENT (INPATIENT)
Facility: HOSPITAL | Age: 52
LOS: 3 days | Discharge: HOME HEALTH | End: 2017-05-19
Attending: ORTHOPAEDIC SURGERY | Admitting: ORTHOPAEDIC SURGERY
Payer: MEDICAID

## 2017-05-15 ENCOUNTER — APPOINTMENT (OUTPATIENT)
Dept: ORTHOPEDIC SURGERY | Facility: HOSPITAL | Age: 52
End: 2017-05-15

## 2017-05-15 ENCOUNTER — TRANSCRIPTION ENCOUNTER (OUTPATIENT)
Age: 52
End: 2017-05-15

## 2017-05-15 ENCOUNTER — RESULT REVIEW (OUTPATIENT)
Age: 52
End: 2017-05-15

## 2017-05-15 VITALS
SYSTOLIC BLOOD PRESSURE: 124 MMHG | TEMPERATURE: 97 F | RESPIRATION RATE: 17 BRPM | DIASTOLIC BLOOD PRESSURE: 71 MMHG | OXYGEN SATURATION: 98 % | HEIGHT: 72 IN | WEIGHT: 268.96 LBS | HEART RATE: 80 BPM

## 2017-05-15 DIAGNOSIS — Z98.89 OTHER SPECIFIED POSTPROCEDURAL STATES: Chronic | ICD-10-CM

## 2017-05-15 DIAGNOSIS — Z96.641 PRESENCE OF RIGHT ARTIFICIAL HIP JOINT: Chronic | ICD-10-CM

## 2017-05-15 DIAGNOSIS — Z96.659 PRESENCE OF UNSPECIFIED ARTIFICIAL KNEE JOINT: Chronic | ICD-10-CM

## 2017-05-15 DIAGNOSIS — Z96.642 PRESENCE OF LEFT ARTIFICIAL HIP JOINT: Chronic | ICD-10-CM

## 2017-05-15 LAB
ANION GAP SERPL CALC-SCNC: 3 MMOL/L — LOW (ref 5–17)
BUN SERPL-MCNC: 13 MG/DL — SIGNIFICANT CHANGE UP (ref 7–23)
CALCIUM SERPL-MCNC: 8.1 MG/DL — LOW (ref 8.5–10.1)
CHLORIDE SERPL-SCNC: 109 MMOL/L — HIGH (ref 96–108)
CO2 SERPL-SCNC: 30 MMOL/L — SIGNIFICANT CHANGE UP (ref 22–31)
CREAT SERPL-MCNC: 0.84 MG/DL — SIGNIFICANT CHANGE UP (ref 0.5–1.3)
GLUCOSE SERPL-MCNC: 97 MG/DL — SIGNIFICANT CHANGE UP (ref 70–99)
HCT VFR BLD CALC: 34.5 % — LOW (ref 39–50)
HGB BLD-MCNC: 11.6 G/DL — LOW (ref 13–17)
MCHC RBC-ENTMCNC: 27.5 PG — SIGNIFICANT CHANGE UP (ref 27–34)
MCHC RBC-ENTMCNC: 33.8 GM/DL — SIGNIFICANT CHANGE UP (ref 32–36)
MCV RBC AUTO: 81.6 FL — SIGNIFICANT CHANGE UP (ref 80–100)
PLATELET # BLD AUTO: 207 K/UL — SIGNIFICANT CHANGE UP (ref 150–400)
POTASSIUM SERPL-MCNC: 4.8 MMOL/L — SIGNIFICANT CHANGE UP (ref 3.5–5.3)
POTASSIUM SERPL-SCNC: 4.8 MMOL/L — SIGNIFICANT CHANGE UP (ref 3.5–5.3)
RBC # BLD: 4.23 M/UL — SIGNIFICANT CHANGE UP (ref 4.2–5.8)
RBC # FLD: 12.5 % — SIGNIFICANT CHANGE UP (ref 11–15)
SODIUM SERPL-SCNC: 142 MMOL/L — SIGNIFICANT CHANGE UP (ref 135–145)
WBC # BLD: 4.9 K/UL — SIGNIFICANT CHANGE UP (ref 3.8–10.5)
WBC # FLD AUTO: 4.9 K/UL — SIGNIFICANT CHANGE UP (ref 3.8–10.5)

## 2017-05-15 PROCEDURE — 27447 TOTAL KNEE ARTHROPLASTY: CPT | Mod: LT

## 2017-05-15 PROCEDURE — 88311 DECALCIFY TISSUE: CPT | Mod: 26

## 2017-05-15 PROCEDURE — 88305 TISSUE EXAM BY PATHOLOGIST: CPT | Mod: 26

## 2017-05-15 PROCEDURE — 73560 X-RAY EXAM OF KNEE 1 OR 2: CPT | Mod: 26,LT

## 2017-05-15 RX ORDER — BENZOCAINE AND MENTHOL 5; 1 G/100ML; G/100ML
1 LIQUID ORAL
Qty: 0 | Refills: 0 | Status: DISCONTINUED | OUTPATIENT
Start: 2017-05-15 | End: 2017-05-19

## 2017-05-15 RX ORDER — POLYETHYLENE GLYCOL 3350 17 G/17G
17 POWDER, FOR SOLUTION ORAL DAILY
Qty: 0 | Refills: 0 | Status: DISCONTINUED | OUTPATIENT
Start: 2017-05-15 | End: 2017-05-19

## 2017-05-15 RX ORDER — CELECOXIB 200 MG/1
200 CAPSULE ORAL ONCE
Qty: 0 | Refills: 0 | Status: COMPLETED | OUTPATIENT
Start: 2017-05-15 | End: 2017-05-15

## 2017-05-15 RX ORDER — OXYCODONE HYDROCHLORIDE 5 MG/1
5 TABLET ORAL EVERY 4 HOURS
Qty: 0 | Refills: 0 | Status: DISCONTINUED | OUTPATIENT
Start: 2017-05-15 | End: 2017-05-19

## 2017-05-15 RX ORDER — CYCLOBENZAPRINE HYDROCHLORIDE 10 MG/1
5 TABLET, FILM COATED ORAL THREE TIMES A DAY
Qty: 0 | Refills: 0 | Status: DISCONTINUED | OUTPATIENT
Start: 2017-05-15 | End: 2017-05-19

## 2017-05-15 RX ORDER — ACETAMINOPHEN 500 MG
1000 TABLET ORAL ONCE
Qty: 0 | Refills: 0 | Status: COMPLETED | OUTPATIENT
Start: 2017-05-15 | End: 2017-05-15

## 2017-05-15 RX ORDER — ONDANSETRON 8 MG/1
4 TABLET, FILM COATED ORAL EVERY 6 HOURS
Qty: 0 | Refills: 0 | Status: DISCONTINUED | OUTPATIENT
Start: 2017-05-15 | End: 2017-05-19

## 2017-05-15 RX ORDER — OXYCODONE HYDROCHLORIDE 5 MG/1
20 TABLET ORAL ONCE
Qty: 0 | Refills: 0 | Status: DISCONTINUED | OUTPATIENT
Start: 2017-05-15 | End: 2017-05-15

## 2017-05-15 RX ORDER — OXYCODONE HYDROCHLORIDE 5 MG/1
10 TABLET ORAL EVERY 4 HOURS
Qty: 0 | Refills: 0 | Status: DISCONTINUED | OUTPATIENT
Start: 2017-05-15 | End: 2017-05-19

## 2017-05-15 RX ORDER — SODIUM CHLORIDE 9 MG/ML
1000 INJECTION, SOLUTION INTRAVENOUS
Qty: 0 | Refills: 0 | Status: DISCONTINUED | OUTPATIENT
Start: 2017-05-15 | End: 2017-05-15

## 2017-05-15 RX ORDER — SODIUM CHLORIDE 9 MG/ML
3 INJECTION INTRAMUSCULAR; INTRAVENOUS; SUBCUTANEOUS EVERY 8 HOURS
Qty: 0 | Refills: 0 | Status: DISCONTINUED | OUTPATIENT
Start: 2017-05-15 | End: 2017-05-15

## 2017-05-15 RX ORDER — ACETAMINOPHEN 500 MG
650 TABLET ORAL EVERY 6 HOURS
Qty: 0 | Refills: 0 | Status: DISCONTINUED | OUTPATIENT
Start: 2017-05-15 | End: 2017-05-19

## 2017-05-15 RX ORDER — HYDROMORPHONE HYDROCHLORIDE 2 MG/ML
0.5 INJECTION INTRAMUSCULAR; INTRAVENOUS; SUBCUTANEOUS
Qty: 0 | Refills: 0 | Status: DISCONTINUED | OUTPATIENT
Start: 2017-05-15 | End: 2017-05-15

## 2017-05-15 RX ORDER — ACETAMINOPHEN 500 MG
650 TABLET ORAL ONCE
Qty: 0 | Refills: 0 | Status: COMPLETED | OUTPATIENT
Start: 2017-05-15 | End: 2017-05-15

## 2017-05-15 RX ORDER — DOCUSATE SODIUM 100 MG
100 CAPSULE ORAL THREE TIMES A DAY
Qty: 0 | Refills: 0 | Status: DISCONTINUED | OUTPATIENT
Start: 2017-05-15 | End: 2017-05-19

## 2017-05-15 RX ORDER — HYDROMORPHONE HYDROCHLORIDE 2 MG/ML
1 INJECTION INTRAMUSCULAR; INTRAVENOUS; SUBCUTANEOUS
Qty: 0 | Refills: 0 | Status: DISCONTINUED | OUTPATIENT
Start: 2017-05-15 | End: 2017-05-15

## 2017-05-15 RX ORDER — DOCUSATE SODIUM 100 MG
1 CAPSULE ORAL
Qty: 60 | Refills: 0 | OUTPATIENT
Start: 2017-05-15

## 2017-05-15 RX ORDER — FOLIC ACID 0.8 MG
1 TABLET ORAL DAILY
Qty: 0 | Refills: 0 | Status: DISCONTINUED | OUTPATIENT
Start: 2017-05-15 | End: 2017-05-19

## 2017-05-15 RX ORDER — CEFAZOLIN SODIUM 1 G
2000 VIAL (EA) INJECTION EVERY 8 HOURS
Qty: 0 | Refills: 0 | Status: COMPLETED | OUTPATIENT
Start: 2017-05-15 | End: 2017-05-15

## 2017-05-15 RX ORDER — DIPHENHYDRAMINE HCL 50 MG
25 CAPSULE ORAL EVERY 4 HOURS
Qty: 0 | Refills: 0 | Status: DISCONTINUED | OUTPATIENT
Start: 2017-05-15 | End: 2017-05-19

## 2017-05-15 RX ORDER — DIPHENHYDRAMINE HCL 50 MG
50 CAPSULE ORAL AT BEDTIME
Qty: 0 | Refills: 0 | Status: DISCONTINUED | OUTPATIENT
Start: 2017-05-15 | End: 2017-05-19

## 2017-05-15 RX ORDER — HYDROMORPHONE HYDROCHLORIDE 2 MG/ML
0.5 INJECTION INTRAMUSCULAR; INTRAVENOUS; SUBCUTANEOUS
Qty: 0 | Refills: 0 | Status: DISCONTINUED | OUTPATIENT
Start: 2017-05-15 | End: 2017-05-17

## 2017-05-15 RX ORDER — SODIUM CHLORIDE 9 MG/ML
1000 INJECTION INTRAMUSCULAR; INTRAVENOUS; SUBCUTANEOUS
Qty: 0 | Refills: 0 | Status: DISCONTINUED | OUTPATIENT
Start: 2017-05-15 | End: 2017-05-16

## 2017-05-15 RX ORDER — SENNA PLUS 8.6 MG/1
2 TABLET ORAL AT BEDTIME
Qty: 0 | Refills: 0 | Status: DISCONTINUED | OUTPATIENT
Start: 2017-05-15 | End: 2017-05-19

## 2017-05-15 RX ORDER — RIVAROXABAN 15 MG-20MG
20 KIT ORAL DAILY
Qty: 0 | Refills: 0 | Status: DISCONTINUED | OUTPATIENT
Start: 2017-05-16 | End: 2017-05-19

## 2017-05-15 RX ORDER — ASCORBIC ACID 60 MG
500 TABLET,CHEWABLE ORAL
Qty: 0 | Refills: 0 | Status: DISCONTINUED | OUTPATIENT
Start: 2017-05-15 | End: 2017-05-19

## 2017-05-15 RX ORDER — OXYCODONE HYDROCHLORIDE 5 MG/1
1 TABLET ORAL
Qty: 42 | Refills: 0 | OUTPATIENT
Start: 2017-05-15 | End: 2017-05-22

## 2017-05-15 RX ORDER — FERROUS SULFATE 325(65) MG
325 TABLET ORAL
Qty: 0 | Refills: 0 | Status: DISCONTINUED | OUTPATIENT
Start: 2017-05-15 | End: 2017-05-19

## 2017-05-15 RX ORDER — ONDANSETRON 8 MG/1
4 TABLET, FILM COATED ORAL ONCE
Qty: 0 | Refills: 0 | Status: DISCONTINUED | OUTPATIENT
Start: 2017-05-15 | End: 2017-05-15

## 2017-05-15 RX ORDER — MAGNESIUM HYDROXIDE 400 MG/1
30 TABLET, CHEWABLE ORAL DAILY
Qty: 0 | Refills: 0 | Status: DISCONTINUED | OUTPATIENT
Start: 2017-05-15 | End: 2017-05-19

## 2017-05-15 RX ADMIN — CELECOXIB 200 MILLIGRAM(S): 200 CAPSULE ORAL at 07:28

## 2017-05-15 RX ADMIN — Medication 1000 MILLIGRAM(S): at 16:34

## 2017-05-15 RX ADMIN — OXYCODONE HYDROCHLORIDE 10 MILLIGRAM(S): 5 TABLET ORAL at 15:42

## 2017-05-15 RX ADMIN — OXYCODONE HYDROCHLORIDE 10 MILLIGRAM(S): 5 TABLET ORAL at 14:42

## 2017-05-15 RX ADMIN — OXYCODONE HYDROCHLORIDE 10 MILLIGRAM(S): 5 TABLET ORAL at 21:15

## 2017-05-15 RX ADMIN — Medication 100 MILLIGRAM(S): at 23:17

## 2017-05-15 RX ADMIN — OXYCODONE HYDROCHLORIDE 10 MILLIGRAM(S): 5 TABLET ORAL at 20:15

## 2017-05-15 RX ADMIN — Medication 100 MILLIGRAM(S): at 16:54

## 2017-05-15 RX ADMIN — Medication 500 MILLIGRAM(S): at 17:26

## 2017-05-15 RX ADMIN — Medication 400 MILLIGRAM(S): at 16:19

## 2017-05-15 RX ADMIN — Medication 325 MILLIGRAM(S): at 17:26

## 2017-05-15 RX ADMIN — Medication 100 MILLIGRAM(S): at 20:45

## 2017-05-15 RX ADMIN — SODIUM CHLORIDE 75 MILLILITER(S): 9 INJECTION, SOLUTION INTRAVENOUS at 11:23

## 2017-05-15 RX ADMIN — OXYCODONE HYDROCHLORIDE 20 MILLIGRAM(S): 5 TABLET ORAL at 07:28

## 2017-05-15 RX ADMIN — Medication 1 TABLET(S): at 20:44

## 2017-05-15 RX ADMIN — Medication 650 MILLIGRAM(S): at 07:28

## 2017-05-15 RX ADMIN — SODIUM CHLORIDE 75 MILLILITER(S): 9 INJECTION INTRAMUSCULAR; INTRAVENOUS; SUBCUTANEOUS at 14:45

## 2017-05-15 NOTE — DISCHARGE NOTE ADULT - CARE PROVIDER_API CALL
Russ Hernandez), Orthopaedic Surgery  [address] [place] [address]  Phone: [phone]  Fax: [phone] Russ Hernandez), Orthopaedic Surgery  [address] [place] [address]  Phone: [phone]  Fax: [phone]    Matthew Kirkpatrick), Cardiology; Cardiovascular Disease; Nuclear Cardiology  [address] [place] [address]  Phone: [phone]  Fax: [phone]

## 2017-05-15 NOTE — DISCHARGE NOTE ADULT - PLAN OF CARE
resume adl's 1.	Pain Control  2.	Walking with full weight bearing as tolerated, with assistive devices (walker/Cane as Needed)  3.	DVT Prophylaxis with Xaralto as prescribed  4.	PT as needed  5.	Follow up with Dr. Hernandez as Outpatient in 10-14 Days after Discharge from the Hospital. Call Office For Appointment.  6.	Remove Staples Post-Op Day 14, and Remove Dressing Post-Op Day 10, with Daily Dressing Changes as Need.  7.	Ice affected area as Needed  8.	Keep Dressing  Clean and dry. contineu diltiazem. follow up with cardiology Dr. Quiroz within 1 week of discharge from hospital contineu diltiazem. follow up with cardiology Dr. Kirkpatrick in 2 weeks

## 2017-05-15 NOTE — PHYSICAL THERAPY INITIAL EVALUATION ADULT - DIAGNOSIS, PT EVAL
Decreased general endurance and strength LLE, standing and ambulation balance and active motion left knee.

## 2017-05-15 NOTE — PHYSICAL THERAPY INITIAL EVALUATION ADULT - LIVES WITH, PROFILE
Pt. stated he lives in a private house with 4 family members, 5 steps with rail to enter, 12 steps inside the house.

## 2017-05-15 NOTE — DISCHARGE NOTE ADULT - NS AS ACTIVITY OBS
Do not drive or operate machinery/No Heavy lifting/straining Do not drive or operate machinery/Walking-Outdoors allowed/No Heavy lifting/straining/Stairs allowed/Walking-Indoors allowed

## 2017-05-15 NOTE — DISCHARGE NOTE ADULT - CARE PLAN
Principal Discharge DX:	S/P knee replacement  Goal:	resume adl's  Instructions for follow-up, activity and diet:	1.	Pain Control  2.	Walking with full weight bearing as tolerated, with assistive devices (walker/Cane as Needed)  3.	DVT Prophylaxis with Xaralto as prescribed  4.	PT as needed  5.	Follow up with Dr. Hernandez as Outpatient in 10-14 Days after Discharge from the Hospital. Call Office For Appointment.  6.	Remove Staples Post-Op Day 14, and Remove Dressing Post-Op Day 10, with Daily Dressing Changes as Need.  7.	Ice affected area as Needed  8.	Keep Dressing  Clean and dry. Principal Discharge DX:	S/P knee replacement  Goal:	resume adl's  Instructions for follow-up, activity and diet:	1.	Pain Control  2.	Walking with full weight bearing as tolerated, with assistive devices (walker/Cane as Needed)  3.	DVT Prophylaxis with Xaralto as prescribed  4.	PT as needed  5.	Follow up with Dr. Hernandez as Outpatient in 10-14 Days after Discharge from the Hospital. Call Office For Appointment.  6.	Remove Staples Post-Op Day 14, and Remove Dressing Post-Op Day 10, with Daily Dressing Changes as Need.  7.	Ice affected area as Needed  8.	Keep Dressing  Clean and dry.  Secondary Diagnosis:	Afib  Instructions for follow-up, activity and diet:	contineu diltiazem. follow up with cardiology Dr. Quiroz within 1 week of discharge from hospital Principal Discharge DX:	S/P knee replacement  Goal:	resume adl's  Instructions for follow-up, activity and diet:	1.	Pain Control  2.	Walking with full weight bearing as tolerated, with assistive devices (walker/Cane as Needed)  3.	DVT Prophylaxis with Xaralto as prescribed  4.	PT as needed  5.	Follow up with Dr. Hernandez as Outpatient in 10-14 Days after Discharge from the Hospital. Call Office For Appointment.  6.	Remove Staples Post-Op Day 14, and Remove Dressing Post-Op Day 10, with Daily Dressing Changes as Need.  7.	Ice affected area as Needed  8.	Keep Dressing  Clean and dry.  Secondary Diagnosis:	Afib  Instructions for follow-up, activity and diet:	contineu diltiazem. follow up with cardiology Dr. Kirkpatrick in 2 weeks

## 2017-05-15 NOTE — PHYSICAL THERAPY INITIAL EVALUATION ADULT - IMPAIRMENTS FOUND, PT EVAL
aerobic capacity/endurance/ergonomics and body mechanics/muscle strength/gait, locomotion, and balance/ROM

## 2017-05-15 NOTE — DISCHARGE NOTE ADULT - PATIENT PORTAL LINK FT
“You can access the FollowHealth Patient Portal, offered by [facility], by registering with the following website: [email or web address]”

## 2017-05-15 NOTE — DISCHARGE NOTE ADULT - HOSPITAL COURSE
The patient is a 52 year old male status post elective Total Knee Arthroplasty to the Left knee after failing outpatient non-operative conservative management.  Patient presented to [facility] after being medically cleared for an elective surgical procedure. The patient was taken to the operating room on date mentioned above. Prophylactic antibiotics were started before the procedure and continued for 24 hours.  There were no complications during the procedure and patient tolerated the procedure well.  The patient was transferred to recovery room in stable condition and subsequently to surgical floor.  Patient was placed on Xaralto for anticoagulation.  All home medications were continued.  The patient received physical therapy daily and daily labs were followed.  The dressing was kept clean, dry, intact.  The rest of the hospital stay was unremarkable. The patient is a 52 year old male status post elective Total Knee Arthroplasty to the Left knee after failing outpatient non-operative conservative management.  Patient presented to [facility] after being medically cleared for an elective surgical procedure. The patient was taken to the operating room on date mentioned above. Prophylactic antibiotics were started before the procedure and continued for 24 hours.  There were no complications during the procedure and patient tolerated the procedure well.  The patient was transferred to recovery room in stable condition and subsequently to surgical floor.  Patient was placed on Xaralto for anticoagulation.  All home medications were continued.  The patient received physical therapy daily and daily labs were followed. The dressing was kept clean, dry, intact. The patient was found to be in rapid afib on post op day 3. Cardiology was consulted and all recommendations were followed. The patient also had not had a bowel movement. Abd xray confirmed no obstruction. Bowel regimen was  adjustedThe rest of the hospital stay was unremarkable.

## 2017-05-15 NOTE — DISCHARGE NOTE ADULT - CARE PROVIDERS DIRECT ADDRESSES
[email or web address]

## 2017-05-15 NOTE — DISCHARGE NOTE ADULT - MEDICATION SUMMARY - MEDICATIONS TO TAKE
I will START or STAY ON the medications listed below when I get home from the hospital:    oxyCODONE 5 mg oral tablet  -- 1 tab(s) by mouth every 4 hours, As Needed -for severe pain MDD:6  -- Caution federal law prohibits the transfer of this drug to any person other  than the person for whom it was prescribed.  It is very important that you take or use this exactly as directed.  Do not skip doses or discontinue unless directed by your doctor.  May cause drowsiness.  Alcohol may intensify this effect.  Use care when operating dangerous machinery.  This prescription cannot be refilled.  Using more of this medication than prescribed may cause serious breathing problems.    -- Indication: For pain    Xarelto 20 mg oral tablet  -- 1 tab(s) by mouth once a day (in the evening)  -- Check with your doctor before becoming pregnant.  It is very important that you take or use this exactly as directed.  Do not skip doses or discontinue unless directed by your doctor.  Obtain medical advice before taking any non-prescription drugs as some may affect the action of this medication.  Take with food.    -- Indication: For dvt prophylaxis    Colace 100 mg oral capsule  -- 1 cap(s) by mouth 3 times a day, As Needed -for constipation MDD:3. while taking pain medication.  -- Medication should be taken with plenty of water.    -- Indication: For constipation I will START or STAY ON the medications listed below when I get home from the hospital:    oxyCODONE 5 mg oral tablet  -- 1 tab(s) by mouth every 4 hours, As Needed -for severe pain MDD:6  -- Caution federal law prohibits the transfer of this drug to any person other  than the person for whom it was prescribed.  It is very important that you take or use this exactly as directed.  Do not skip doses or discontinue unless directed by your doctor.  May cause drowsiness.  Alcohol may intensify this effect.  Use care when operating dangerous machinery.  This prescription cannot be refilled.  Using more of this medication than prescribed may cause serious breathing problems.    -- Indication: For pain    dilTIAZem 30 mg oral tablet  -- 1 tab(s) by mouth every 8 hours MDD:3  -- Indication: For afib    Xarelto 20 mg oral tablet  -- 1 tab(s) by mouth once a day (in the evening)  -- Check with your doctor before becoming pregnant.  It is very important that you take or use this exactly as directed.  Do not skip doses or discontinue unless directed by your doctor.  Obtain medical advice before taking any non-prescription drugs as some may affect the action of this medication.  Take with food.    -- Indication: For dvt prophylaxis    Colace 100 mg oral capsule  -- 1 cap(s) by mouth 3 times a day, As Needed -for constipation MDD:3. while taking pain medication.  -- Medication should be taken with plenty of water.    -- Indication: For constipation

## 2017-05-15 NOTE — PHYSICAL THERAPY INITIAL EVALUATION ADULT - CRITERIA FOR SKILLED THERAPEUTIC INTERVENTIONS
impairments found/anticipated discharge recommendation/risk reduction/prevention/functional limitations in following categories

## 2017-05-15 NOTE — PHYSICAL THERAPY INITIAL EVALUATION ADULT - GENERAL OBSERVATIONS, REHAB EVAL
Found supine in stretcher, alert, oriented x 4, follow directions, on room air, ace wrap and dressing dry and intact, c/o pain and tightness in the left knee, weakness in the Left leg.

## 2017-05-16 LAB
ANION GAP SERPL CALC-SCNC: 9 MMOL/L — SIGNIFICANT CHANGE UP (ref 5–17)
BUN SERPL-MCNC: 13 MG/DL — SIGNIFICANT CHANGE UP (ref 7–23)
CALCIUM SERPL-MCNC: 7.8 MG/DL — LOW (ref 8.5–10.1)
CHLORIDE SERPL-SCNC: 105 MMOL/L — SIGNIFICANT CHANGE UP (ref 96–108)
CO2 SERPL-SCNC: 28 MMOL/L — SIGNIFICANT CHANGE UP (ref 22–31)
CREAT SERPL-MCNC: 0.7 MG/DL — SIGNIFICANT CHANGE UP (ref 0.5–1.3)
GLUCOSE SERPL-MCNC: 114 MG/DL — HIGH (ref 70–99)
HCT VFR BLD CALC: 31.7 % — LOW (ref 39–50)
HGB BLD-MCNC: 11.2 G/DL — LOW (ref 13–17)
MCHC RBC-ENTMCNC: 28.4 PG — SIGNIFICANT CHANGE UP (ref 27–34)
MCHC RBC-ENTMCNC: 35.3 GM/DL — SIGNIFICANT CHANGE UP (ref 32–36)
MCV RBC AUTO: 80.5 FL — SIGNIFICANT CHANGE UP (ref 80–100)
PLATELET # BLD AUTO: 187 K/UL — SIGNIFICANT CHANGE UP (ref 150–400)
POTASSIUM SERPL-MCNC: 4.1 MMOL/L — SIGNIFICANT CHANGE UP (ref 3.5–5.3)
POTASSIUM SERPL-SCNC: 4.1 MMOL/L — SIGNIFICANT CHANGE UP (ref 3.5–5.3)
RBC # BLD: 3.94 M/UL — LOW (ref 4.2–5.8)
RBC # FLD: 12.6 % — SIGNIFICANT CHANGE UP (ref 11–15)
SODIUM SERPL-SCNC: 142 MMOL/L — SIGNIFICANT CHANGE UP (ref 135–145)
WBC # BLD: 6 K/UL — SIGNIFICANT CHANGE UP (ref 3.8–10.5)
WBC # FLD AUTO: 6 K/UL — SIGNIFICANT CHANGE UP (ref 3.8–10.5)

## 2017-05-16 RX ORDER — RIVAROXABAN 15 MG-20MG
1 KIT ORAL
Qty: 30 | Refills: 0 | OUTPATIENT
Start: 2017-05-16 | End: 2017-06-15

## 2017-05-16 RX ADMIN — Medication 100 MILLIGRAM(S): at 05:09

## 2017-05-16 RX ADMIN — OXYCODONE HYDROCHLORIDE 10 MILLIGRAM(S): 5 TABLET ORAL at 01:39

## 2017-05-16 RX ADMIN — POLYETHYLENE GLYCOL 3350 17 GRAM(S): 17 POWDER, FOR SOLUTION ORAL at 11:57

## 2017-05-16 RX ADMIN — Medication 325 MILLIGRAM(S): at 11:56

## 2017-05-16 RX ADMIN — HYDROMORPHONE HYDROCHLORIDE 0.5 MILLIGRAM(S): 2 INJECTION INTRAMUSCULAR; INTRAVENOUS; SUBCUTANEOUS at 09:39

## 2017-05-16 RX ADMIN — Medication 1 TABLET(S): at 13:02

## 2017-05-16 RX ADMIN — OXYCODONE HYDROCHLORIDE 10 MILLIGRAM(S): 5 TABLET ORAL at 05:55

## 2017-05-16 RX ADMIN — Medication 1 MILLIGRAM(S): at 11:56

## 2017-05-16 RX ADMIN — HYDROMORPHONE HYDROCHLORIDE 0.5 MILLIGRAM(S): 2 INJECTION INTRAMUSCULAR; INTRAVENOUS; SUBCUTANEOUS at 14:00

## 2017-05-16 RX ADMIN — Medication 500 MILLIGRAM(S): at 05:09

## 2017-05-16 RX ADMIN — Medication 1 TABLET(S): at 11:57

## 2017-05-16 RX ADMIN — Medication 325 MILLIGRAM(S): at 17:10

## 2017-05-16 RX ADMIN — SODIUM CHLORIDE 75 MILLILITER(S): 9 INJECTION INTRAMUSCULAR; INTRAVENOUS; SUBCUTANEOUS at 05:10

## 2017-05-16 RX ADMIN — Medication 100 MILLIGRAM(S): at 21:33

## 2017-05-16 RX ADMIN — OXYCODONE HYDROCHLORIDE 10 MILLIGRAM(S): 5 TABLET ORAL at 07:31

## 2017-05-16 RX ADMIN — Medication 1 TABLET(S): at 21:33

## 2017-05-16 RX ADMIN — HYDROMORPHONE HYDROCHLORIDE 0.5 MILLIGRAM(S): 2 INJECTION INTRAMUSCULAR; INTRAVENOUS; SUBCUTANEOUS at 18:39

## 2017-05-16 RX ADMIN — Medication 1 TABLET(S): at 05:09

## 2017-05-16 RX ADMIN — HYDROMORPHONE HYDROCHLORIDE 0.5 MILLIGRAM(S): 2 INJECTION INTRAMUSCULAR; INTRAVENOUS; SUBCUTANEOUS at 21:32

## 2017-05-16 RX ADMIN — HYDROMORPHONE HYDROCHLORIDE 0.5 MILLIGRAM(S): 2 INJECTION INTRAMUSCULAR; INTRAVENOUS; SUBCUTANEOUS at 18:14

## 2017-05-16 RX ADMIN — HYDROMORPHONE HYDROCHLORIDE 0.5 MILLIGRAM(S): 2 INJECTION INTRAMUSCULAR; INTRAVENOUS; SUBCUTANEOUS at 10:05

## 2017-05-16 RX ADMIN — HYDROMORPHONE HYDROCHLORIDE 0.5 MILLIGRAM(S): 2 INJECTION INTRAMUSCULAR; INTRAVENOUS; SUBCUTANEOUS at 13:25

## 2017-05-16 RX ADMIN — Medication 100 MILLIGRAM(S): at 13:02

## 2017-05-16 RX ADMIN — Medication 500 MILLIGRAM(S): at 17:10

## 2017-05-16 RX ADMIN — HYDROMORPHONE HYDROCHLORIDE 0.5 MILLIGRAM(S): 2 INJECTION INTRAMUSCULAR; INTRAVENOUS; SUBCUTANEOUS at 21:47

## 2017-05-16 RX ADMIN — RIVAROXABAN 20 MILLIGRAM(S): KIT at 12:00

## 2017-05-16 RX ADMIN — OXYCODONE HYDROCHLORIDE 10 MILLIGRAM(S): 5 TABLET ORAL at 02:39

## 2017-05-16 RX ADMIN — Medication 325 MILLIGRAM(S): at 08:38

## 2017-05-16 NOTE — PROGRESS NOTE ADULT - SUBJECTIVE AND OBJECTIVE BOX
Pt S/E at bedside, no acute events overnight, pain controlled    Vital Signs Last 24 Hrs  T(C): 37.2, Max: 37.8 (05-16 @ 01:30)  T(F): 98.9, Max: 100.1 (05-16 @ 01:30)  HR: 92 (50 - 96)  BP: 139/77 (102/59 - 148/75)  BP(mean): --  RR: 20 (13 - 20)  SpO2: 100% (97% - 100%)    AVSS  Gen: NAD, AAOx3    LLE:  Dressing clean dry intact  +EHL/FHL/TA/GS  SILT L3-S1  +DP/PT Pulses  Compartments soft  No calf TTP B/L    A/P: 53 yo M s/p L TKA POD 1  Analgesia  DVT ppx, On Xaralto - continue xaralto outpt  WBAT  PT/OT  DC planning

## 2017-05-17 LAB
ANION GAP SERPL CALC-SCNC: 7 MMOL/L — SIGNIFICANT CHANGE UP (ref 5–17)
BUN SERPL-MCNC: 9 MG/DL — SIGNIFICANT CHANGE UP (ref 7–23)
CALCIUM SERPL-MCNC: 8.5 MG/DL — SIGNIFICANT CHANGE UP (ref 8.5–10.1)
CHLORIDE SERPL-SCNC: 103 MMOL/L — SIGNIFICANT CHANGE UP (ref 96–108)
CO2 SERPL-SCNC: 30 MMOL/L — SIGNIFICANT CHANGE UP (ref 22–31)
CREAT SERPL-MCNC: 0.77 MG/DL — SIGNIFICANT CHANGE UP (ref 0.5–1.3)
GLUCOSE SERPL-MCNC: 113 MG/DL — HIGH (ref 70–99)
HCT VFR BLD CALC: 30.9 % — LOW (ref 39–50)
HGB BLD-MCNC: 10.5 G/DL — LOW (ref 13–17)
MCHC RBC-ENTMCNC: 27.6 PG — SIGNIFICANT CHANGE UP (ref 27–34)
MCHC RBC-ENTMCNC: 34.2 GM/DL — SIGNIFICANT CHANGE UP (ref 32–36)
MCV RBC AUTO: 80.6 FL — SIGNIFICANT CHANGE UP (ref 80–100)
PLATELET # BLD AUTO: 184 K/UL — SIGNIFICANT CHANGE UP (ref 150–400)
POTASSIUM SERPL-MCNC: 4.3 MMOL/L — SIGNIFICANT CHANGE UP (ref 3.5–5.3)
POTASSIUM SERPL-SCNC: 4.3 MMOL/L — SIGNIFICANT CHANGE UP (ref 3.5–5.3)
RBC # BLD: 3.83 M/UL — LOW (ref 4.2–5.8)
RBC # FLD: 12.2 % — SIGNIFICANT CHANGE UP (ref 11–15)
SODIUM SERPL-SCNC: 140 MMOL/L — SIGNIFICANT CHANGE UP (ref 135–145)
SURGICAL PATHOLOGY FINAL REPORT - CH: SIGNIFICANT CHANGE UP
WBC # BLD: 9.4 K/UL — SIGNIFICANT CHANGE UP (ref 3.8–10.5)
WBC # FLD AUTO: 9.4 K/UL — SIGNIFICANT CHANGE UP (ref 3.8–10.5)

## 2017-05-17 RX ORDER — ONDANSETRON 8 MG/1
8 TABLET, FILM COATED ORAL ONCE
Qty: 0 | Refills: 0 | Status: COMPLETED | OUTPATIENT
Start: 2017-05-17 | End: 2017-05-17

## 2017-05-17 RX ORDER — SODIUM CHLORIDE 9 MG/ML
500 INJECTION INTRAMUSCULAR; INTRAVENOUS; SUBCUTANEOUS ONCE
Qty: 0 | Refills: 0 | Status: COMPLETED | OUTPATIENT
Start: 2017-05-17 | End: 2017-05-17

## 2017-05-17 RX ORDER — ACETAMINOPHEN 500 MG
1000 TABLET ORAL ONCE
Qty: 0 | Refills: 0 | Status: COMPLETED | OUTPATIENT
Start: 2017-05-17 | End: 2017-05-17

## 2017-05-17 RX ORDER — KETOROLAC TROMETHAMINE 30 MG/ML
30 SYRINGE (ML) INJECTION ONCE
Qty: 0 | Refills: 0 | Status: DISCONTINUED | OUTPATIENT
Start: 2017-05-17 | End: 2017-05-17

## 2017-05-17 RX ADMIN — MAGNESIUM HYDROXIDE 30 MILLILITER(S): 400 TABLET, CHEWABLE ORAL at 12:05

## 2017-05-17 RX ADMIN — Medication 100 MILLIGRAM(S): at 06:26

## 2017-05-17 RX ADMIN — HYDROMORPHONE HYDROCHLORIDE 0.5 MILLIGRAM(S): 2 INJECTION INTRAMUSCULAR; INTRAVENOUS; SUBCUTANEOUS at 04:46

## 2017-05-17 RX ADMIN — Medication 30 MILLIGRAM(S): at 10:35

## 2017-05-17 RX ADMIN — Medication 500 MILLIGRAM(S): at 19:12

## 2017-05-17 RX ADMIN — Medication 1 TABLET(S): at 20:48

## 2017-05-17 RX ADMIN — Medication 325 MILLIGRAM(S): at 19:12

## 2017-05-17 RX ADMIN — Medication 325 MILLIGRAM(S): at 12:05

## 2017-05-17 RX ADMIN — ONDANSETRON 8 MILLIGRAM(S): 8 TABLET, FILM COATED ORAL at 13:57

## 2017-05-17 RX ADMIN — Medication 1 TABLET(S): at 13:55

## 2017-05-17 RX ADMIN — Medication 1 TABLET(S): at 06:26

## 2017-05-17 RX ADMIN — Medication 500 MILLIGRAM(S): at 06:26

## 2017-05-17 RX ADMIN — OXYCODONE HYDROCHLORIDE 10 MILLIGRAM(S): 5 TABLET ORAL at 10:10

## 2017-05-17 RX ADMIN — SODIUM CHLORIDE 1000 MILLILITER(S): 9 INJECTION INTRAMUSCULAR; INTRAVENOUS; SUBCUTANEOUS at 12:15

## 2017-05-17 RX ADMIN — Medication 325 MILLIGRAM(S): at 09:17

## 2017-05-17 RX ADMIN — OXYCODONE HYDROCHLORIDE 10 MILLIGRAM(S): 5 TABLET ORAL at 21:48

## 2017-05-17 RX ADMIN — Medication 30 MILLIGRAM(S): at 11:00

## 2017-05-17 RX ADMIN — OXYCODONE HYDROCHLORIDE 10 MILLIGRAM(S): 5 TABLET ORAL at 16:38

## 2017-05-17 RX ADMIN — OXYCODONE HYDROCHLORIDE 10 MILLIGRAM(S): 5 TABLET ORAL at 20:48

## 2017-05-17 RX ADMIN — RIVAROXABAN 20 MILLIGRAM(S): KIT at 12:07

## 2017-05-17 RX ADMIN — Medication 1 MILLIGRAM(S): at 12:05

## 2017-05-17 RX ADMIN — SENNA PLUS 2 TABLET(S): 8.6 TABLET ORAL at 20:48

## 2017-05-17 RX ADMIN — Medication 100 MILLIGRAM(S): at 20:48

## 2017-05-17 RX ADMIN — OXYCODONE HYDROCHLORIDE 10 MILLIGRAM(S): 5 TABLET ORAL at 09:16

## 2017-05-17 RX ADMIN — Medication 100 MILLIGRAM(S): at 13:54

## 2017-05-17 RX ADMIN — OXYCODONE HYDROCHLORIDE 10 MILLIGRAM(S): 5 TABLET ORAL at 17:35

## 2017-05-17 RX ADMIN — Medication 1000 MILLIGRAM(S): at 11:00

## 2017-05-17 RX ADMIN — POLYETHYLENE GLYCOL 3350 17 GRAM(S): 17 POWDER, FOR SOLUTION ORAL at 12:06

## 2017-05-17 RX ADMIN — Medication 1 TABLET(S): at 12:05

## 2017-05-17 RX ADMIN — Medication 400 MILLIGRAM(S): at 10:35

## 2017-05-17 RX ADMIN — HYDROMORPHONE HYDROCHLORIDE 0.5 MILLIGRAM(S): 2 INJECTION INTRAMUSCULAR; INTRAVENOUS; SUBCUTANEOUS at 04:24

## 2017-05-17 NOTE — PROGRESS NOTE ADULT - ATTENDING COMMENTS
patient seen and examined.  agree with resident assesment and plan.  pain controlled. pt/wbat. dvtp-xarelto.  dispo home today v ivan

## 2017-05-17 NOTE — OCCUPATIONAL THERAPY INITIAL EVALUATION ADULT - TRANSFER SAFETY CONCERNS NOTED: BED/CHAIR, REHAB EVAL
decreased step length/losing balance/decreased weight-shifting ability/decreased balance during turns

## 2017-05-17 NOTE — OCCUPATIONAL THERAPY INITIAL EVALUATION ADULT - PRECAUTIONS/LIMITATIONS, REHAB EVAL
fall precautions/obesity precautions/monitor vital signs with activities; pt has history  of multiple  comorbidities  , right BATOOL left THR, and right TKR/cardiac precautions

## 2017-05-17 NOTE — OCCUPATIONAL THERAPY INITIAL EVALUATION ADULT - GENERAL OBSERVATIONS, REHAB EVAL
Mr Bragg  was encountered supine in bed, AA&Ox4, cooperative & followed commands. Left knee dressing is clean , dry and intact. Pt presents with left knee pain, weakness, stiffness  and decreased ROM due to s/p TKR; these deficits interfere with pt's performance of self care tasks and functional mobility

## 2017-05-17 NOTE — OCCUPATIONAL THERAPY INITIAL EVALUATION ADULT - PLANNED THERAPY INTERVENTIONS, OT EVAL
stretching/ADL retraining/balance training/bed mobility training/transfer training/neuromuscular re-education/ROM/IADL retraining/joint mobilization/strengthening/energy conservation techniques

## 2017-05-17 NOTE — OCCUPATIONAL THERAPY INITIAL EVALUATION ADULT - ADDITIONAL COMMENTS
Prior to admission, Pt was functioning in his  roles, self sufficient & ambulating independently without any assistive devices.  Presently, pt needs assistance with self care tasks and functional mobility. pt can assist with upper body ADL , but needs assist with lower body ADL. The scale below depicts a picture of the pt's current level of functioning. Barthel Index: Feeding Score___10__, Bathing Score____0__, Grooming Score__5___, Dressing Score__5___, Bowel Score___10__, Bladder Score___10___, Toilet Score_5____, Transfer Score____10__, Mobility Score__10___, Stairs Score___5__, Total Score___70/100__.

## 2017-05-17 NOTE — OCCUPATIONAL THERAPY INITIAL EVALUATION ADULT - SOCIAL CONCERNS
Pt voiced concerns  about  his  recovery at home and emphasized that his family will be home to assist  him as needed/Complex psychosocial needs/coping issues

## 2017-05-17 NOTE — PROGRESS NOTE ADULT - SUBJECTIVE AND OBJECTIVE BOX
Patient seen and examined. Pain controlled. No acute events overnight    Vital Signs Last 24 Hrs  T(C): 37.5, Max: 38.2 (05-16 @ 17:12)  T(F): 99.5, Max: 100.8 (05-16 @ 17:12)  HR: 60 (60 - 105)  BP: 134/68 (133/81 - 154/70)  BP(mean): --  RR: 16 (16 - 18)  SpO2: 95% (94% - 100%)    Physical Exam  Gen: NAD  LLE:   Dressing c/d/i  +EHL/FHL/Gsc/TA  SILT L3-S1  DP +  Compartments soft  No calf ttp    A/P: 52y Male sp L TKA  POD 2  Pain control  DVT ppx  PT/OT, WBAT  FU labs  Dispo planning  D/w attending

## 2017-05-17 NOTE — OCCUPATIONAL THERAPY INITIAL EVALUATION ADULT - LIVES WITH, PROFILE
in a family a private house with 8 steps to enter  and 1 flight of steps to get to the second floor ; pt's  bathroom has  tub and shower combination and I equipped with raised toilet  set , but no shower chair or grab bars/children/parents

## 2017-05-18 LAB
ANION GAP SERPL CALC-SCNC: 4 MMOL/L — LOW (ref 5–17)
BUN SERPL-MCNC: 14 MG/DL — SIGNIFICANT CHANGE UP (ref 7–23)
CALCIUM SERPL-MCNC: 8.2 MG/DL — LOW (ref 8.5–10.1)
CHLORIDE SERPL-SCNC: 101 MMOL/L — SIGNIFICANT CHANGE UP (ref 96–108)
CO2 SERPL-SCNC: 33 MMOL/L — HIGH (ref 22–31)
CREAT SERPL-MCNC: 1 MG/DL — SIGNIFICANT CHANGE UP (ref 0.5–1.3)
GLUCOSE SERPL-MCNC: 130 MG/DL — HIGH (ref 70–99)
HCT VFR BLD CALC: 29.1 % — LOW (ref 39–50)
HGB BLD-MCNC: 10.2 G/DL — LOW (ref 13–17)
MAGNESIUM SERPL-MCNC: 2.3 MG/DL — SIGNIFICANT CHANGE UP (ref 1.6–2.6)
MCHC RBC-ENTMCNC: 28.4 PG — SIGNIFICANT CHANGE UP (ref 27–34)
MCHC RBC-ENTMCNC: 35.2 GM/DL — SIGNIFICANT CHANGE UP (ref 32–36)
MCV RBC AUTO: 80.7 FL — SIGNIFICANT CHANGE UP (ref 80–100)
PLATELET # BLD AUTO: 180 K/UL — SIGNIFICANT CHANGE UP (ref 150–400)
POTASSIUM SERPL-MCNC: 4.3 MMOL/L — SIGNIFICANT CHANGE UP (ref 3.5–5.3)
POTASSIUM SERPL-SCNC: 4.3 MMOL/L — SIGNIFICANT CHANGE UP (ref 3.5–5.3)
RBC # BLD: 3.6 M/UL — LOW (ref 4.2–5.8)
RBC # FLD: 12 % — SIGNIFICANT CHANGE UP (ref 11–15)
SODIUM SERPL-SCNC: 138 MMOL/L — SIGNIFICANT CHANGE UP (ref 135–145)
WBC # BLD: 9.6 K/UL — SIGNIFICANT CHANGE UP (ref 3.8–10.5)
WBC # FLD AUTO: 9.6 K/UL — SIGNIFICANT CHANGE UP (ref 3.8–10.5)

## 2017-05-18 PROCEDURE — 74000: CPT | Mod: 26

## 2017-05-18 PROCEDURE — 99232 SBSQ HOSP IP/OBS MODERATE 35: CPT

## 2017-05-18 RX ORDER — MAGNESIUM HYDROXIDE 400 MG/1
60 TABLET, CHEWABLE ORAL ONCE
Qty: 0 | Refills: 0 | Status: COMPLETED | OUTPATIENT
Start: 2017-05-18 | End: 2017-05-18

## 2017-05-18 RX ADMIN — Medication 1 TABLET(S): at 23:03

## 2017-05-18 RX ADMIN — OXYCODONE HYDROCHLORIDE 10 MILLIGRAM(S): 5 TABLET ORAL at 19:20

## 2017-05-18 RX ADMIN — Medication 1 TABLET(S): at 11:41

## 2017-05-18 RX ADMIN — OXYCODONE HYDROCHLORIDE 10 MILLIGRAM(S): 5 TABLET ORAL at 23:50

## 2017-05-18 RX ADMIN — Medication 1 MILLIGRAM(S): at 11:41

## 2017-05-18 RX ADMIN — Medication 100 MILLIGRAM(S): at 23:03

## 2017-05-18 RX ADMIN — Medication 1 TABLET(S): at 05:27

## 2017-05-18 RX ADMIN — Medication 500 MILLIGRAM(S): at 05:27

## 2017-05-18 RX ADMIN — Medication 10 MILLIGRAM(S): at 05:29

## 2017-05-18 RX ADMIN — MAGNESIUM HYDROXIDE 60 MILLILITER(S): 400 TABLET, CHEWABLE ORAL at 11:41

## 2017-05-18 RX ADMIN — OXYCODONE HYDROCHLORIDE 10 MILLIGRAM(S): 5 TABLET ORAL at 03:18

## 2017-05-18 RX ADMIN — OXYCODONE HYDROCHLORIDE 10 MILLIGRAM(S): 5 TABLET ORAL at 11:15

## 2017-05-18 RX ADMIN — Medication 650 MILLIGRAM(S): at 16:50

## 2017-05-18 RX ADMIN — Medication 1 TABLET(S): at 13:32

## 2017-05-18 RX ADMIN — OXYCODONE HYDROCHLORIDE 10 MILLIGRAM(S): 5 TABLET ORAL at 02:18

## 2017-05-18 RX ADMIN — Medication 100 MILLIGRAM(S): at 13:32

## 2017-05-18 RX ADMIN — Medication 100 MILLIGRAM(S): at 05:27

## 2017-05-18 RX ADMIN — OXYCODONE HYDROCHLORIDE 10 MILLIGRAM(S): 5 TABLET ORAL at 18:26

## 2017-05-18 RX ADMIN — OXYCODONE HYDROCHLORIDE 10 MILLIGRAM(S): 5 TABLET ORAL at 10:15

## 2017-05-18 RX ADMIN — OXYCODONE HYDROCHLORIDE 10 MILLIGRAM(S): 5 TABLET ORAL at 23:03

## 2017-05-18 RX ADMIN — Medication 500 MILLIGRAM(S): at 18:26

## 2017-05-18 RX ADMIN — RIVAROXABAN 20 MILLIGRAM(S): KIT at 11:41

## 2017-05-18 RX ADMIN — Medication 325 MILLIGRAM(S): at 11:41

## 2017-05-18 RX ADMIN — POLYETHYLENE GLYCOL 3350 17 GRAM(S): 17 POWDER, FOR SOLUTION ORAL at 11:41

## 2017-05-18 NOTE — CHART NOTE - NSCHARTNOTEFT_GEN_A_CORE
Hospitalist Medicine PA    RRT Note    CC: Called by RN to evaluate pt for  on EKG and in 160s-170s pulse ox.  HPI: Patient is a 52y old Male w/ Hx of A Fib s/p L TKR POD #3. RRT called for pt  bpm. Pt c/o palpitations and nausea. Pt denies chest pain, SOB, dizziness, vomiting, abdominal pain. Pt states he was on Cardizem for A Fib but stopped taking it after last refill finished. Pt states similar episodes after last 3 surgeries in past.    VITALS:  T(C): 37.2, Max: 37.3 (05-17 @ 17:34)  HR: 125 (82 - 126)  BP: 124/76 (103/67 - 141/68)  RR: 16 (15 - 16)  SpO2: 98% (94% - 169%)    PHYSICAL EXAM:  General: NAD, well-groomed, well-developed.  Lungs: Airway patent. CTA B/L. Normal breath sounds. No wheezes, rales, rhonchi.  Heart: Pulse irregular. Tachycardic. Normal S1/S2. No murmurs appreciated.  Abdomen: Soft, NT/ND. Normoactive BS x 4 quadrants.  Neurological:  AAOx3. Good concentration. Strength 5/5 B/L upper and lower extremities. DTRs 2+ intact and symmetric. No pronator drift.  Extremities:  2+ B/L peripheral Pulses. No clubbing, cyanosis, or edema.                         10.2   9.6   )-----------( 180      ( 18 May 2017 07:54 )             29.1     05-18    138  |  101  |  14  ----------------------------<  130<H>  4.3   |  33<H>  |  1.00    Ca    8.2<L>      18 May 2017 07:54      Assessment: Patient 52y S/P L TKR POD#3 now with rapid A Fib  Hx of Afib (stopped Cardizem treatment)  Obesity (BMI 30-39.9)  Hay fever  Heart murmur  S/P knee replacement  S/P hip replacement, left  S/P hip replacement, right  S/P arthroscopic knee surgery      Plan:  - Cardizem 10 mg IVP now  - Start Cardizem 30 mg q8h  - Remote telemetry  - Dr. Taylor consulted  - Magnesium STAT  - Continue current treatment  - Follow up labs  - D/w Dr. Alcantar aware and agree with the plan  - Will continue to follow up Hospitalist Medicine PA    RRT Note    CC: Called by RN to evaluate pt for  on EKG and in 160s-170s pulse ox.  HPI: Patient is a 52y old Male w/ Hx of A Fib s/p L TKR POD #3. RRT called for pt  bpm. Pt c/o palpitations and nausea. Pt denies chest pain, SOB, dizziness, vomiting, abdominal pain. Pt states he was on Cardizem for A Fib but stopped taking it after last refill finished. Pt states similar episodes after last 3 surgeries in past.    VITALS:  T(C): 37.2, Max: 37.3 (05-17 @ 17:34)  HR: 125 (82 - 126)  BP: 124/76 (103/67 - 141/68)  RR: 16 (15 - 16)  SpO2: 98% (94% - 169%)    PHYSICAL EXAM:  General: NAD, well-groomed, well-developed.  Lungs: Airway patent. CTA B/L. Normal breath sounds. No wheezes, rales, rhonchi.  Heart: Pulse irregular. Tachycardic. Normal S1/S2. No murmurs appreciated.  Abdomen: Soft, NT/ND. Normoactive BS x 4 quadrants.  Neurological:  AAOx3. Good concentration. Strength 5/5 B/L upper and lower extremities. DTRs 2+ intact and symmetric. No pronator drift.  Extremities:  2+ B/L peripheral Pulses. No clubbing, cyanosis, or edema.                         10.2   9.6   )-----------( 180      ( 18 May 2017 07:54 )             29.1     05-18    138  |  101  |  14  ----------------------------<  130<H>  4.3   |  33<H>  |  1.00    Ca    8.2<L>      18 May 2017 07:54      Assessment: Patient 52y S/P L TKR POD#3 now with rapid A Fib  Hx of Afib (stopped Cardizem treatment)  Obesity (BMI 30-39.9)  Hay fever  Heart murmur  S/P knee replacement  S/P hip replacement, left  S/P hip replacement, right  S/P arthroscopic knee surgery    Plan:  - Cardizem 10 mg IVP now  - Start Cardizem 30 mg q8h  - Remote telemetry  - Cardiology consulted (Dr. Taylor aware)  - Magnesium STAT  - Continue current treatment  - Follow up labs  - D/w Dr. Alcantar aware and agree with the plan  - Will continue to follow up Hospitalist Medicine PA    RRT Note    CC: Called by RN to evaluate pt for  on EKG and in 160s-170s pulse ox.  HPI: Patient is a 52y old Male w/ Hx of A Fib s/p L TKR POD #3. RRT called for pt  bpm. Pt c/o palpitations and nausea. Pt denies chest pain, SOB, dizziness, vomiting, abdominal pain. Pt states he was on Cardizem for A Fib but stopped taking it after last refill finished. Pt states similar episodes after last 3 surgeries in past.    VITALS:  T(C): 37.2, Max: 37.3 (05-17 @ 17:34)  HR: 125 (82 - 126)  BP: 124/76 (103/67 - 141/68)  RR: 16 (15 - 16)  SpO2: 98% (94% - 169%)    PHYSICAL EXAM:  General: NAD, well-groomed, well-developed.  Lungs: Airway patent. CTA B/L. Normal breath sounds. No wheezes, rales, rhonchi.  Heart: Pulse irregular. Tachycardic. Normal S1/S2. No murmurs appreciated.  Abdomen: Soft, NT/ND. Normoactive BS x 4 quadrants.  Neurological:  AAOx3. Good concentration. Strength 5/5 B/L upper and lower extremities. DTRs 2+ intact and symmetric. No pronator drift.  Extremities:  2+ B/L peripheral Pulses. No clubbing, cyanosis, or edema.                         10.2   9.6   )-----------( 180      ( 18 May 2017 07:54 )             29.1     05-18    138  |  101  |  14  ----------------------------<  130<H>  4.3   |  33<H>  |  1.00    Ca    8.2<L>      18 May 2017 07:54      Assessment: Patient 52y S/P L TKR POD#3 now with rapid A Fib  Hx of Afib (stopped Cardizem treatment)  Obesity (BMI 30-39.9)  Hay fever  Heart murmur  S/P knee replacement  S/P hip replacement, left  S/P hip replacement, right  S/P arthroscopic knee surgery    Plan:  - Cardizem 10 mg IVP now  -already on Xarelto for a. fib  - Start Cardizem 30 mg q8h  - Remote telemetry  - Cardiology consulted (Dr. Taylor/Casimiro made aware)  - Magnesium STAT  - Continue current treatment  - Follow up labs  - D/w Dr. Alcantar aware and agree with the plan  - Will continue to follow up

## 2017-05-18 NOTE — PROGRESS NOTE ADULT - SUBJECTIVE AND OBJECTIVE BOX
Patient seen and examined. Pain controlled. No acute events overnight    AVSS  Physical Exam  Gen: NAD  LLE:   Dressing c/d/i. changed today  +EHL/FHL/Gsc/TA  SILT L3-S1  DP +  Compartments soft  No calf ttp    A/P: 52y Male sp L TKA  POD 3  Pain control  DVT ppx  PT/OT, WBAT  FU labs  Dispo planning  D/w attending

## 2017-05-18 NOTE — CONSULT NOTE ADULT - ASSESSMENT
52 with PAF now sinus tachy. Continue pain management, reload CACB's which should be continued post discharge.  No need for further diagnostic testing . NOAC as per ortho.  f/u in my office in 2-4 weeks.

## 2017-05-18 NOTE — CONSULT NOTE ADULT - SUBJECTIVE AND OBJECTIVE BOX
CARDIOLOGY CONSULT NOTE    Patient is a 52y Male with a known history of :    HPI: s/p l knee arthroplasty, h/o PAF, this is now the third post op episode. On NOAC's but self discontinued his CACB's, likely cause for this exacerbation.  Otherwise, no chest pain, sob or dizziness. +Palpitations.  No h/o ischemic heart disease, stress test last year was negative.      REVIEW OF SYSTEMS:    CONSTITUTIONAL: No fever, weight loss, or fatigue  EYES: No eye pain, visual disturbances, or discharge  ENMT:  No difficulty hearing, tinnitus, vertigo; No sinus or throat pain  NECK: No pain or stiffness  BREASTS: No pain, masses, or nipple discharge  RESPIRATORY: No cough, wheezing, chills or hemoptysis; No shortness of breath  CARDIOVASCULAR: see hpi  GASTROINTESTINAL: No abdominal or epigastric pain. No nausea, vomiting, or hematemesis; No diarrhea or constipation. No melena or hematochezia.  GENITOURINARY: No dysuria, frequency, hematuria, or incontinence  NEUROLOGICAL: No headaches, memory loss, loss of strength, numbness, or tremors  SKIN: No itching, burning, rashes, or lesions   LYMPH NODES: No enlarged glands  ENDOCRINE: No heat or cold intolerance; No hair loss  MUSCULOSKELETAL: No joint pain or swelling; No muscle, back, or extremity pain  PSYCHIATRIC: No depression, anxiety, mood swings, or difficulty sleeping  HEME/LYMPH: No easy bruising, or bleeding gums  ALLERGY AND IMMUNOLOGIC: No hives or eczema    MEDICATIONS  (STANDING):  calcium carbonate 1250 mG + Vitamin D (OsCal 500 + D) 1Tablet(s) Oral three times a day  polyethylene glycol 3350 17Gram(s) Oral daily  docusate sodium 100milliGRAM(s) Oral three times a day  ferrous    sulfate 325milliGRAM(s) Oral three times a day with meals  folic acid 1milliGRAM(s) Oral daily  multivitamin 1Tablet(s) Oral daily  ascorbic acid 500milliGRAM(s) Oral two times a day  rivaroxaban 20milliGRAM(s) Oral daily  diltiazem    Tablet 30milliGRAM(s) Oral every 8 hours    MEDICATIONS  (PRN):  acetaminophen   Tablet 650milliGRAM(s) Oral every 6 hours PRN For Temp greater than 38 C (100.4 F)  acetaminophen   Tablet. 650milliGRAM(s) Oral every 6 hours PRN headaches  oxyCODONE IR 5milliGRAM(s) Oral every 4 hours PRN Mild Pain  oxyCODONE IR 10milliGRAM(s) Oral every 4 hours PRN Moderate Pain  aluminum hydroxide/magnesium hydroxide/simethicone Suspension 30milliLiter(s) Oral four times a day PRN Indigestion  ondansetron Injectable 4milliGRAM(s) IV Push every 6 hours PRN Nausea and/or Vomiting  diphenhydrAMINE   Capsule 50milliGRAM(s) Oral at bedtime PRN Insomnia  magnesium hydroxide Suspension 30milliLiter(s) Oral daily PRN Constipation  bisacodyl Suppository 10milliGRAM(s) Rectal daily PRN If no bowel movement by postoperative day #2  senna 2Tablet(s) Oral at bedtime PRN Constipation  cyclobenzaprine 5milliGRAM(s) Oral three times a day PRN Muscle Spasm  benzocaine 15 mG/menthol 3.6 mG Lozenge 1Lozenge Oral every 2 hours PRN Sore Throat  diphenhydrAMINE   Capsule 25milliGRAM(s) Oral every 4 hours PRN Rash and/or Itching      ALLERGIES: No Known Allergies      FAMILY HISTORY:  No pertinent family history in first degree relatives      PHYSICAL EXAMINATION:  -----------------------------  T(C): 38.4, Max: 38.4 (05-18 @ 16:33)  HR: 107 (101 - 148)  BP: 119/66 (103/67 - 147/48)  RR: 16 (15 - 18)  SpO2: 98% (95% - 100%)  Wt(kg): --  I & Os for 24h ending 05-18 @ 07:00  =============================================  IN:    Oral Fluid: 1000 ml    Total IN: 1000 ml  ---------------------------------------------  OUT:    Total OUT: 0 ml  ---------------------------------------------  Total NET: 1000 ml    I & Os for current day (as of 05-18 @ 17:50)  =============================================  IN:    Oral Fluid: 240 ml    Total IN: 240 ml  ---------------------------------------------  OUT:    Total OUT: 0 ml  ---------------------------------------------  Total NET: 240 ml        Constitutional: Obese, well developed, normal appearance, well groomed, well nourished, no deformities and no acute distress.   Eyes: the conjunctiva exhibited no abnormalities and the eyelids demonstrated no xanthelasmas.   HEENT: normal oral mucosa, no oral pallor and no oral cyanosis.   Neck: normal jugular venous A waves present, normal jugular venous V waves present and no jugular venous carvalho A waves.   Pulmonary: no respiratory distress, normal respiratory rhythm and effort, no accessory muscle use and lungs were clear to auscultation bilaterally.   Cardiovascular: rapid, regular, no murmur heave or thrill.  Abdomen: soft, non-tender, no hepato-splenomegaly and no abdominal mass palpated.   Musculoskeletal: the gait could not be assessed..   Extremities: no clubbing of the fingernails, no localized cyanosis, no petechial hemorrhages and no ischemic changes.   Skin: normal skin color and pigmentation, no rash, no venous stasis, no skin lesions, no skin ulcer and no xanthoma was observed.   Psychiatric: oriented to person, place, and time, the affect was normal, the mood was normal and not feeling anxious.     LABS:   --------  05-18    138  |  101  |  14  ----------------------------<  130<H>  4.3   |  33<H>  |  1.00    Ca    8.2<L>      18 May 2017 07:54  Mg     2.3     05-18                           10.2   9.6   )-----------( 180      ( 18 May 2017 07:54 )             29.1           ECG: /min, NSSTW changes. F/u ecg shows sinus tachy.

## 2017-05-19 VITALS — TEMPERATURE: 97 F | HEART RATE: 89 BPM

## 2017-05-19 RX ORDER — DILTIAZEM HCL 120 MG
1 CAPSULE, EXT RELEASE 24 HR ORAL
Qty: 30 | Refills: 0 | OUTPATIENT
Start: 2017-05-19

## 2017-05-19 RX ORDER — ACETAMINOPHEN 500 MG
1000 TABLET ORAL ONCE
Qty: 0 | Refills: 0 | Status: COMPLETED | OUTPATIENT
Start: 2017-05-19 | End: 2017-05-19

## 2017-05-19 RX ADMIN — Medication 500 MILLIGRAM(S): at 06:26

## 2017-05-19 RX ADMIN — Medication 100 MILLIGRAM(S): at 06:26

## 2017-05-19 RX ADMIN — Medication 400 MILLIGRAM(S): at 10:58

## 2017-05-19 RX ADMIN — OXYCODONE HYDROCHLORIDE 10 MILLIGRAM(S): 5 TABLET ORAL at 06:26

## 2017-05-19 RX ADMIN — Medication 1 TABLET(S): at 13:34

## 2017-05-19 RX ADMIN — Medication 1 MILLIGRAM(S): at 12:15

## 2017-05-19 RX ADMIN — RIVAROXABAN 20 MILLIGRAM(S): KIT at 12:15

## 2017-05-19 RX ADMIN — Medication 1000 MILLIGRAM(S): at 11:30

## 2017-05-19 RX ADMIN — OXYCODONE HYDROCHLORIDE 10 MILLIGRAM(S): 5 TABLET ORAL at 06:58

## 2017-05-19 RX ADMIN — Medication 1 TABLET(S): at 12:15

## 2017-05-19 RX ADMIN — Medication 100 MILLIGRAM(S): at 13:34

## 2017-05-19 RX ADMIN — Medication 1 TABLET(S): at 06:26

## 2017-05-19 NOTE — PROGRESS NOTE ADULT - SUBJECTIVE AND OBJECTIVE BOX
Patient seen and examined. Pain controlled. Pt had tachycarida yesterday and was found to be in afib. Pt's bowel regimen was adjusted for constipation post op ant pt states he had a BM yesterday. No acute events overnight    Vital Signs Last 24 Hrs  T(C): 36.9, Max: 38.4 (05-18 @ 16:33)  T(F): 98.4, Max: 101.2 (05-18 @ 16:33)  HR: 76 (76 - 148)  BP: 107/62 (103/67 - 147/48)  BP(mean): --  RR: 16 (16 - 18)  SpO2: 93% (93% - 100%)    Physical Exam  Gen: NAD  ___ LE:   Dressing c/d/i  +EHL/FHL/Gsc/TA  SILT L3-S1  DP +  Compartments soft  No calf ttp    A/P: 52y Male sp L TKA POD 4  Pain control  DVT ppx  PT/OT, WBAT  Appreciate cardiology recs- Rx for diltiazem sent to pharmacy  FU labs  Dispo planning- home today  D/w attending

## 2017-05-19 NOTE — PROGRESS NOTE ADULT - ATTENDING COMMENTS
patient seen and examined.  Agree with resident assessment and plan.  pod #4 s/p L TKA.  cards recs appreciated.  Plan to dc home today

## 2017-05-25 DIAGNOSIS — M17.12 UNILATERAL PRIMARY OSTEOARTHRITIS, LEFT KNEE: ICD-10-CM

## 2017-05-25 DIAGNOSIS — Z96.651 PRESENCE OF RIGHT ARTIFICIAL KNEE JOINT: ICD-10-CM

## 2017-05-25 DIAGNOSIS — I48.91 UNSPECIFIED ATRIAL FIBRILLATION: ICD-10-CM

## 2017-05-25 DIAGNOSIS — Z96.643 PRESENCE OF ARTIFICIAL HIP JOINT, BILATERAL: ICD-10-CM

## 2017-05-25 DIAGNOSIS — R00.0 TACHYCARDIA, UNSPECIFIED: ICD-10-CM

## 2017-05-25 DIAGNOSIS — J30.1 ALLERGIC RHINITIS DUE TO POLLEN: ICD-10-CM

## 2017-05-25 DIAGNOSIS — E66.9 OBESITY, UNSPECIFIED: ICD-10-CM

## 2017-05-25 DIAGNOSIS — R01.1 CARDIAC MURMUR, UNSPECIFIED: ICD-10-CM

## 2017-05-26 ENCOUNTER — APPOINTMENT (OUTPATIENT)
Dept: ORTHOPEDIC SURGERY | Facility: CLINIC | Age: 52
End: 2017-05-26

## 2017-05-31 ENCOUNTER — APPOINTMENT (OUTPATIENT)
Dept: CARDIOLOGY | Facility: CLINIC | Age: 52
End: 2017-05-31

## 2017-05-31 ENCOUNTER — NON-APPOINTMENT (OUTPATIENT)
Age: 52
End: 2017-05-31

## 2017-05-31 VITALS
HEART RATE: 100 BPM | BODY MASS INDEX: 34.13 KG/M2 | OXYGEN SATURATION: 98 % | SYSTOLIC BLOOD PRESSURE: 110 MMHG | WEIGHT: 252 LBS | HEIGHT: 72 IN | DIASTOLIC BLOOD PRESSURE: 80 MMHG

## 2017-05-31 RX ORDER — DILTIAZEM HYDROCHLORIDE 180 MG/1
180 CAPSULE, EXTENDED RELEASE ORAL
Qty: 90 | Refills: 2 | Status: ACTIVE | COMMUNITY
Start: 2017-02-02 | End: 1900-01-01

## 2017-05-31 RX ORDER — AMOXICILLIN 500 MG/1
500 CAPSULE ORAL
Qty: 21 | Refills: 0 | Status: DISCONTINUED | COMMUNITY
Start: 2016-12-01 | End: 2017-05-31

## 2017-05-31 RX ORDER — DILTIAZEM HYDROCHLORIDE 30 MG/1
30 TABLET ORAL
Qty: 56 | Refills: 0 | Status: DISCONTINUED | COMMUNITY
Start: 2017-02-02 | End: 2017-05-31

## 2017-06-23 ENCOUNTER — APPOINTMENT (OUTPATIENT)
Dept: ORTHOPEDIC SURGERY | Facility: CLINIC | Age: 52
End: 2017-06-23

## 2017-08-11 ENCOUNTER — APPOINTMENT (OUTPATIENT)
Dept: ORTHOPEDIC SURGERY | Facility: CLINIC | Age: 52
End: 2017-08-11
Payer: MEDICAID

## 2017-08-11 PROCEDURE — 99024 POSTOP FOLLOW-UP VISIT: CPT

## 2017-08-11 PROCEDURE — 73562 X-RAY EXAM OF KNEE 3: CPT | Mod: 50

## 2017-11-17 ENCOUNTER — APPOINTMENT (OUTPATIENT)
Dept: INTERNAL MEDICINE | Facility: CLINIC | Age: 52
End: 2017-11-17
Payer: MEDICAID

## 2017-11-17 ENCOUNTER — APPOINTMENT (OUTPATIENT)
Dept: ORTHOPEDIC SURGERY | Facility: CLINIC | Age: 52
End: 2017-11-17
Payer: MEDICAID

## 2017-11-17 VITALS — HEIGHT: 72 IN | BODY MASS INDEX: 37.38 KG/M2 | WEIGHT: 276 LBS

## 2017-11-17 VITALS
RESPIRATION RATE: 20 BRPM | HEART RATE: 76 BPM | SYSTOLIC BLOOD PRESSURE: 114 MMHG | DIASTOLIC BLOOD PRESSURE: 70 MMHG | OXYGEN SATURATION: 98 %

## 2017-11-17 DIAGNOSIS — Z13.1 ENCOUNTER FOR SCREENING FOR DIABETES MELLITUS: ICD-10-CM

## 2017-11-17 DIAGNOSIS — Z01.818 ENCOUNTER FOR OTHER PREPROCEDURAL EXAMINATION: ICD-10-CM

## 2017-11-17 DIAGNOSIS — Z13.220 ENCOUNTER FOR SCREENING FOR LIPOID DISORDERS: ICD-10-CM

## 2017-11-17 DIAGNOSIS — Z01.810 ENCOUNTER FOR PREPROCEDURAL CARDIOVASCULAR EXAMINATION: ICD-10-CM

## 2017-11-17 PROCEDURE — 36415 COLL VENOUS BLD VENIPUNCTURE: CPT

## 2017-11-17 PROCEDURE — 99396 PREV VISIT EST AGE 40-64: CPT | Mod: 25

## 2017-11-17 PROCEDURE — 73562 X-RAY EXAM OF KNEE 3: CPT | Mod: LT

## 2017-11-17 PROCEDURE — 99214 OFFICE O/P EST MOD 30 MIN: CPT

## 2017-11-20 LAB
25(OH)D3 SERPL-MCNC: 13.5 NG/ML
ALBUMIN SERPL ELPH-MCNC: 3.9 G/DL
ALP BLD-CCNC: 100 U/L
ALT SERPL-CCNC: 15 U/L
ANION GAP SERPL CALC-SCNC: 15 MMOL/L
AST SERPL-CCNC: 22 U/L
BASOPHILS # BLD AUTO: 0 K/UL
BASOPHILS NFR BLD AUTO: 0 %
BILIRUB SERPL-MCNC: 1 MG/DL
BUN SERPL-MCNC: 13 MG/DL
CALCIUM SERPL-MCNC: 9 MG/DL
CHLORIDE SERPL-SCNC: 107 MMOL/L
CHOLEST SERPL-MCNC: 149 MG/DL
CHOLEST/HDLC SERPL: 2.2 RATIO
CO2 SERPL-SCNC: 25 MMOL/L
CREAT SERPL-MCNC: 0.85 MG/DL
EOSINOPHIL # BLD AUTO: 0.06 K/UL
EOSINOPHIL NFR BLD AUTO: 1.6 %
GLUCOSE SERPL-MCNC: 108 MG/DL
HBA1C MFR BLD HPLC: 4.7 %
HCT VFR BLD CALC: 38.2 %
HDLC SERPL-MCNC: 67 MG/DL
HGB BLD-MCNC: 12.3 G/DL
IMM GRANULOCYTES NFR BLD AUTO: 0 %
LDLC SERPL CALC-MCNC: 71 MG/DL
LYMPHOCYTES # BLD AUTO: 1.27 K/UL
LYMPHOCYTES NFR BLD AUTO: 32.9 %
MAN DIFF?: NORMAL
MCHC RBC-ENTMCNC: 27.8 PG
MCHC RBC-ENTMCNC: 32.2 GM/DL
MCV RBC AUTO: 86.4 FL
MONOCYTES # BLD AUTO: 0.28 K/UL
MONOCYTES NFR BLD AUTO: 7.3 %
NEUTROPHILS # BLD AUTO: 2.25 K/UL
NEUTROPHILS NFR BLD AUTO: 58.2 %
PLATELET # BLD AUTO: 241 K/UL
POTASSIUM SERPL-SCNC: 4.9 MMOL/L
PROT SERPL-MCNC: 7.5 G/DL
PSA SERPL-MCNC: 0.37 NG/ML
RBC # BLD: 4.42 M/UL
RBC # FLD: 12.4 %
SODIUM SERPL-SCNC: 147 MMOL/L
TRIGL SERPL-MCNC: 53 MG/DL
TSH SERPL-ACNC: 0.82 UIU/ML
WBC # FLD AUTO: 3.86 K/UL

## 2017-11-20 RX ORDER — MULTIVIT-MIN/FOLIC/VIT K/LYCOP 400-300MCG
50 MCG TABLET ORAL
Refills: 0 | Status: ACTIVE | COMMUNITY

## 2017-12-06 ENCOUNTER — NON-APPOINTMENT (OUTPATIENT)
Age: 52
End: 2017-12-06

## 2017-12-06 ENCOUNTER — APPOINTMENT (OUTPATIENT)
Dept: CARDIOLOGY | Facility: CLINIC | Age: 52
End: 2017-12-06
Payer: MEDICAID

## 2017-12-06 VITALS
HEIGHT: 72 IN | SYSTOLIC BLOOD PRESSURE: 130 MMHG | WEIGHT: 275 LBS | OXYGEN SATURATION: 98 % | BODY MASS INDEX: 37.25 KG/M2 | DIASTOLIC BLOOD PRESSURE: 62 MMHG | HEART RATE: 70 BPM

## 2017-12-06 PROCEDURE — 99214 OFFICE O/P EST MOD 30 MIN: CPT

## 2017-12-06 PROCEDURE — 93000 ELECTROCARDIOGRAM COMPLETE: CPT

## 2017-12-06 RX ORDER — OXYCODONE 5 MG/1
5 TABLET ORAL
Qty: 60 | Refills: 0 | Status: DISCONTINUED | COMMUNITY
Start: 2017-02-14 | End: 2017-12-06

## 2018-01-02 ENCOUNTER — OTHER (OUTPATIENT)
Age: 53
End: 2018-01-02

## 2018-01-29 ENCOUNTER — APPOINTMENT (OUTPATIENT)
Dept: CARDIOLOGY | Facility: CLINIC | Age: 53
End: 2018-01-29
Payer: MEDICAID

## 2018-01-29 PROCEDURE — 93225 XTRNL ECG REC<48 HRS REC: CPT

## 2018-01-29 PROCEDURE — 93227 XTRNL ECG REC<48 HR R&I: CPT

## 2018-01-30 NOTE — PHYSICAL THERAPY INITIAL EVALUATION ADULT - PHYSICAL ASSIST/NONPHYSICAL ASSIST: SIT/STAND, REHAB EVAL
Anesthesia ROS/Med Hx    Overall Review:  Pts. EKG was reviewed     Pulmonary Review:    Negative for pulmonary    Neuro/Psych Review:    Negative for all neuro/psych ROS    Cardiovascular Review:    Pt. positive for hypertension  Pt. positive for hyperlipidemia    GI/HEPATIC/RENAL Review:    Pt. negative for GI/Hepatic/Renal ROS    End/Other Review:    Pt. positive for obesity class II - 35.00 - 39.99    Anesthesia Plan  ASA Status: 3  Anesthesia Type: General  Preferred Airway Type: ETT  Reviewed: Lab Results, Medications, Beta Blocker Status, Nursing Notes, Problem List, EKG, NPO Status, Pre-Induction Reassessment, Past Med History, Allergies and Patient Summary  The proposed anesthetic plan, including its risks and benefits, have been discussed with the Patient - along with the risks and benefits of alternatives.  Questions were encouraged and answered and the patient and/or representative agrees to proceed.  Plan Comments: Pt. Id and evaluated. Anesthetic plan including GA and fascia iliaca block risks and benefits discussed with pt. Pt. Agreeable to proceed.      Physical Exam  Mallampati: II  TM Distance: <3 FB  Neck ROM: Limited  cardiovascular exam normal  pulmonary exam normal  abdominal exam normal  dental exam normal      
1 person assist/verbal cues/nonverbal cues (demo/gestures)

## 2018-02-22 NOTE — CONSULT NOTE ADULT - SUBJECTIVE AND OBJECTIVE BOX
English
Patient is a 52y old  Male who presents with a chief complaint of total right knee (30 Jan 2017 21:53)      INTERVAL HPI/OVERNIGHT EVENTS: Patient awoke with left sided chest pain post op day 2 right total knee replacement patient complains of left sided chest  described as a tingling similar to when he was diagnosed with Afib no shortness of breath  no abdominal pain no leg swelling leg pain controlled     MEDICATIONS  (STANDING):  sodium chloride 0.9% lock flush 3milliLiter(s) IV Push every 8 hours  celecoxib 200milliGRAM(s) Oral once  acetaminophen   Tablet. 650milliGRAM(s) Oral once  oxyCODONE ER Tablet 20milliGRAM(s) Oral once  sodium chloride 0.9%. 1000milliLiter(s) IV Continuous <Continuous>  calcium carbonate 1250 mG + Vitamin D (OsCal 500 + D) 1Tablet(s) Oral three times a day  pantoprazole    Tablet 40milliGRAM(s) Oral daily  polyethylene glycol 3350 17Gram(s) Oral daily  docusate sodium 100milliGRAM(s) Oral three times a day  rivaroxaban 20milliGRAM(s) Oral daily    MEDICATIONS  (PRN):  acetaminophen   Tablet 650milliGRAM(s) Oral every 6 hours PRN For Temp greater than 38 C (100.4 F) and headache  oxyCODONE IR 5milliGRAM(s) Oral every 4 hours PRN Mild Pain  oxyCODONE IR 10milliGRAM(s) Oral every 4 hours PRN Moderate Pain  HYDROmorphone  Injectable 0.5milliGRAM(s) IV Push every 3 hours PRN Severe Pain  aluminum hydroxide/magnesium hydroxide/simethicone Suspension 30milliLiter(s) Oral four times a day PRN Indigestion  ondansetron Injectable 4milliGRAM(s) IV Push every 6 hours PRN Nausea and/or Vomiting  prochlorperazine   Injectable 5milliGRAM(s) IV Push once PRN Nausea and/or Vomiting  diphenhydrAMINE   Capsule 25milliGRAM(s) Oral at bedtime PRN Insomnia  magnesium hydroxide Suspension 30milliLiter(s) Oral daily PRN Constipation  bisacodyl Suppository 10milliGRAM(s) Rectal daily PRN If no bowel movement by postoperative day #2  senna 2Tablet(s) Oral at bedtime PRN Constipation      Allergies    No Known Allergies    Intolerances        REVIEW OF SYSTEMS:  CONSTITUTIONAL: No fever, weight loss, or fatigue  EYES: No eye pain, visual disturbances, or discharge  ENMT:  No difficulty hearing, tinnitus, vertigo; No sinus or throat pain  NECK: No pain or stiffness  BREASTS: No pain, masses, or nipple discharge  RESPIRATORY: No cough, wheezing, chills or hemoptysis; No shortness of breath  CARDIOVASCULAR: No chest pain, palpitations, dizziness, or leg swelling  GASTROINTESTINAL: No abdominal or epigastric pain. No nausea, vomiting, or hematemesis; No diarrhea or constipation. No melena or hematochezia.  GENITOURINARY: No dysuria, frequency, hematuria, or incontinence  NEUROLOGICAL: No headaches, memory loss, loss of strength, numbness, or tremors  SKIN: No itching, burning, rashes, or lesions   LYMPH NODES: No enlarged glands  ENDOCRINE: No heat or cold intolerance; No hair loss  MUSCULOSKELETAL:right knee post op pain   PSYCHIATRIC: No depression, anxiety, mood swings, or difficulty sleeping  HEME/LYMPH: No easy bruising, or bleeding gums  ALLERGY AND IMMUNOLOGIC: No hives or eczema    Vital Signs Last 24 Hrs  T(C): 36.6, Max: 37.7 (02-01 @ 05:25)  T(F): 97.8, Max: 99.8 (02-01 @ 05:25)  HR: 113 (77 - 113)  BP: 125/75 (124/76 - 150/95)  BP(mean): --  RR: 17 (16 - 17)  SpO2: 94% (94% - 98%)    PHYSICAL EXAM:  GENERAL: NAD, well-groomed, well-developed  HEAD:  Atraumatic, Normocephalic  EYES: EOMI, PERRLA, conjunctiva and sclera clear  ENMT: No tonsillar erythema, exudates, or enlargement; Moist mucous membranes, Good dentition, No lesions  NECK: Supple, No JVD, Normal thyroid  NERVOUS SYSTEM:  Alert & Oriented X3, Good concentration; Motor Strength 5/5 B/L upper and lower extremities; DTRs 2+ intact and symmetric  CHEST/LUNG: Clear to percussion bilaterally; No rales, rhonchi, wheezing, or rubs  HEART: Regular rate and rhythm; No murmurs, rubs, or gallops  ABDOMEN: Soft, Nontender, Nondistended; Bowel sounds present  EXTREMITIES:  2+ Peripheral Pulses, No clubbing, cyanosis, or edema  LYMPH: No lymphadenopathy noted  SKIN: No rashes or lesions    LABS:                        11.1   6.1   )-----------( 186      ( 31 Jan 2017 06:47 )             31.9     31 Jan 2017 06:47    141    |  105    |  15     ----------------------------<  142    3.9     |  27     |  0.82     Ca    7.8        31 Jan 2017 06:47          CAPILLARY BLOOD GLUCOSE      RADIOLOGY & ADDITIONAL TESTS:    Imaging Personally Reviewed:  [x ] YES  [ ] NO    Consultant(s) Notes Reviewed:  [X ] YES  [ ] NO    Care Discussed with Consultants/Other Providers [x ] YES  [ ] NO    EXG: sinus tach non specific st changes change in rhythym from afib to sinus
Patient is a 52y old male well known to me from office with h/o PAF (also post-op after his hip surgery) ---> NSR. Patient awoke this morning with left sided chest pain post op day 2 right total knee replacement, no shortness of breath  no abdominal pain no leg swelling leg pain controlled  Pain is positional, radiated to neck, patient has been persistently diaphoretic for past day. Rapid response called this morning when he went into rapid Afib while being stood up; pt says the pains got worse. He seems to have responded to IV Cardizem. He is now in Stach in 110's.  No h/o CAD, stress test done in my office several months ago was negative.     MEDICATIONS  (STANDING):  sodium chloride 0.9% lock flush 3milliLiter(s) IV Push every 8 hours  celecoxib 200milliGRAM(s) Oral once  acetaminophen   Tablet. 650milliGRAM(s) Oral once  oxyCODONE ER Tablet 20milliGRAM(s) Oral once  sodium chloride 0.9%. 1000milliLiter(s) IV Continuous <Continuous>  calcium carbonate 1250 mG + Vitamin D (OsCal 500 + D) 1Tablet(s) Oral three times a day  pantoprazole    Tablet 40milliGRAM(s) Oral daily  polyethylene glycol 3350 17Gram(s) Oral daily  docusate sodium 100milliGRAM(s) Oral three times a day  rivaroxaban 20milliGRAM(s) Oral daily    MEDICATIONS  (PRN):  acetaminophen   Tablet 650milliGRAM(s) Oral every 6 hours PRN For Temp greater than 38 C (100.4 F) and headache  oxyCODONE IR 5milliGRAM(s) Oral every 4 hours PRN Mild Pain  oxyCODONE IR 10milliGRAM(s) Oral every 4 hours PRN Moderate Pain  HYDROmorphone  Injectable 0.5milliGRAM(s) IV Push every 3 hours PRN Severe Pain  aluminum hydroxide/magnesium hydroxide/simethicone Suspension 30milliLiter(s) Oral four times a day PRN Indigestion  ondansetron Injectable 4milliGRAM(s) IV Push every 6 hours PRN Nausea and/or Vomiting  prochlorperazine   Injectable 5milliGRAM(s) IV Push once PRN Nausea and/or Vomiting  diphenhydrAMINE   Capsule 25milliGRAM(s) Oral at bedtime PRN Insomnia  magnesium hydroxide Suspension 30milliLiter(s) Oral daily PRN Constipation  bisacodyl Suppository 10milliGRAM(s) Rectal daily PRN If no bowel movement by postoperative day #2  senna 2Tablet(s) Oral at bedtime PRN Constipation      Allergies    No Known Allergies    Intolerances        REVIEW OF SYSTEMS:  CONSTITUTIONAL: No fever, weight loss, or fatigue  EYES: No eye pain, visual disturbances, or discharge  ENMT:  No difficulty hearing, tinnitus, vertigo; No sinus or throat pain  NECK: No pain or stiffness  BREASTS: No pain, masses, or nipple discharge  RESPIRATORY: No cough, wheezing, chills or hemoptysis; No shortness of breath  CARDIOVASCULAR: see hpi  GASTROINTESTINAL: No abdominal or epigastric pain. No nausea, vomiting, or hematemesis; No diarrhea or constipation. No melena or hematochezia.  GENITOURINARY: No dysuria, frequency, hematuria, or incontinence  NEUROLOGICAL: No headaches, memory loss, loss of strength, numbness, or tremors  SKIN: No itching, burning, rashes, or lesions   LYMPH NODES: No enlarged glands  ENDOCRINE: No heat or cold intolerance; No hair loss  MUSCULOSKELETAL:right knee post op pain   PSYCHIATRIC: No depression, anxiety, mood swings, or difficulty sleeping  HEME/LYMPH: No easy bruising, or bleeding gums  ALLERGY AND IMMUNOLOGIC: No hives or eczema    Vital Signs Last 24 Hrs  T(C): 36.6, Max: 37.7 (02-01 @ 05:25)  T(F): 97.8, Max: 99.8 (02-01 @ 05:25)  HR: 113 (77 - 113)  BP: 125/75 (124/76 - 150/95)  BP(mean): --  RR: 17 (16 - 17)  SpO2: 94% (94% - 98%)    PHYSICAL EXAM:  GENERAL: NAD, well-groomed, well-developed  HEAD:  Atraumatic, Normocephalic  EYES: EOMI, PERRLA, conjunctiva and sclera clear  ENMT: No tonsillar erythema, exudates, or enlargement; Moist mucous membranes, Good dentition, No lesions  NECK: Supple, No JVD, Normal thyroid  NERVOUS SYSTEM:  Alert & Oriented X3, Good concentration; Motor Strength 5/5 B/L upper and lower extremities; DTRs 2+ intact and symmetric  CHEST/LUNG: Clear to percussion bilaterally; No rales, rhonchi, wheezing, or rubs  HEART: Regular rate and rhythm; No murmurs, rubs, or gallops  ABDOMEN: Soft, Nontender, Nondistended; Bowel sounds present  EXTREMITIES:  2+ Peripheral Pulses, No clubbing, cyanosis, or edema  LYMPH: No lymphadenopathy noted  SKIN: No rashes or lesions    LABS:                        11.1   6.1   )-----------( 186      ( 31 Jan 2017 06:47 )             31.9     31 Jan 2017 06:47    141    |  105    |  15     ----------------------------<  142    3.9     |  27     |  0.82     Ca    7.8        31 Jan 2017 06:47          EXG: Stach, NSSTW changes.  Tele strips reviewed - PAF now in Stach.

## 2018-04-27 NOTE — H&P PST ADULT - DENTITION
normal
Partially impaired: cannot see medication labels or newsprint, but can see obstacles in path, and the surrounding layout; can count fingers at arm's length/glasses

## 2018-05-18 ENCOUNTER — APPOINTMENT (OUTPATIENT)
Dept: ORTHOPEDIC SURGERY | Facility: CLINIC | Age: 53
End: 2018-05-18
Payer: MEDICAID

## 2018-05-18 VITALS
WEIGHT: 280 LBS | HEART RATE: 73 BPM | HEIGHT: 72 IN | DIASTOLIC BLOOD PRESSURE: 82 MMHG | BODY MASS INDEX: 37.93 KG/M2 | SYSTOLIC BLOOD PRESSURE: 138 MMHG

## 2018-05-18 PROCEDURE — 72170 X-RAY EXAM OF PELVIS: CPT

## 2018-05-18 PROCEDURE — 73562 X-RAY EXAM OF KNEE 3: CPT | Mod: LT

## 2018-05-18 PROCEDURE — 99214 OFFICE O/P EST MOD 30 MIN: CPT

## 2019-01-01 NOTE — PHYSICAL THERAPY INITIAL EVALUATION ADULT - FOLLOWS COMMANDS/ANSWERS QUESTIONS, REHAB EVAL
Principal Discharge DX:	[place] infant of 41 completed weeks of gestation  Goal:	routine care of   Assessment and plan of treatment:	Routine care of . Please follow up with your pediatrician in 1-2days.   Please make sure to feed your  every 3 hours or sooner as baby demands. Breast milk is preferable, either through breastfeeding or via pumping of breast milk. If you do not have enough breast milk please supplement with formula. Please seek immediate medical attention is your baby seems to not be feeding well or has persistent vomiting. If baby appears yellow or jaundiced please consult with your pediatrician. You must follow up with your pediatrician in 1-2 days. If your baby has a fever of 100.4F or more you must seek medical care in an emergency room immediately. Please call [facility] or your pediatrician if you should have any other questions or concerns.
100% of the time

## 2019-02-06 ENCOUNTER — APPOINTMENT (OUTPATIENT)
Dept: INTERNAL MEDICINE | Facility: CLINIC | Age: 54
End: 2019-02-06
Payer: MEDICAID

## 2019-02-06 ENCOUNTER — NON-APPOINTMENT (OUTPATIENT)
Age: 54
End: 2019-02-06

## 2019-02-06 VITALS — DIASTOLIC BLOOD PRESSURE: 74 MMHG | HEART RATE: 74 BPM | SYSTOLIC BLOOD PRESSURE: 122 MMHG | RESPIRATION RATE: 16 BRPM

## 2019-02-06 VITALS — BODY MASS INDEX: 37.93 KG/M2 | HEIGHT: 72 IN | WEIGHT: 280 LBS

## 2019-02-06 DIAGNOSIS — M70.61 TROCHANTERIC BURSITIS, RIGHT HIP: ICD-10-CM

## 2019-02-06 DIAGNOSIS — Z12.5 ENCOUNTER FOR SCREENING FOR MALIGNANT NEOPLASM OF PROSTATE: ICD-10-CM

## 2019-02-06 DIAGNOSIS — R07.89 OTHER CHEST PAIN: ICD-10-CM

## 2019-02-06 DIAGNOSIS — I83.90 ASYMPTOMATIC VARICOSE VEINS OF UNSPECIFIED LOWER EXTREMITY: ICD-10-CM

## 2019-02-06 DIAGNOSIS — Z28.21 IMMUNIZATION NOT CARRIED OUT BECAUSE OF PATIENT REFUSAL: ICD-10-CM

## 2019-02-06 DIAGNOSIS — Z86.79 PERSONAL HISTORY OF OTHER DISEASES OF THE CIRCULATORY SYSTEM: ICD-10-CM

## 2019-02-06 PROCEDURE — 36415 COLL VENOUS BLD VENIPUNCTURE: CPT

## 2019-02-06 PROCEDURE — 93000 ELECTROCARDIOGRAM COMPLETE: CPT

## 2019-02-06 PROCEDURE — 99396 PREV VISIT EST AGE 40-64: CPT | Mod: 25

## 2019-02-06 RX ORDER — ACETAMINOPHEN AND CODEINE 300; 30 MG/1; MG/1
300-30 TABLET ORAL
Qty: 20 | Refills: 0 | Status: DISCONTINUED | COMMUNITY
Start: 2018-01-12 | End: 2019-02-06

## 2019-02-06 NOTE — HISTORY OF PRESENT ILLNESS
[FreeTextEntry1] : annual PE, paf, obesity [de-identified] : Pt is a 51 y/o male with a hx of paf, obesity, s/p bilat tka, kelly \par For annual PE\par No longer with hip/back pain - still with stiffness.  \par no noted CV sx.  Has been taking the rx for the af.\par Asking re: testosterone.  Reports adequate diet and exercise.  Has not been able to lose wt.  some muscle gain.  no fatigue.  No hot or cold intol.  no noted ed or dec libido.\par \par tet - up to date.  '13\par Declines flu vaccine\par \par colon - '13 - ? when he was supposed to f/u.  GI office has since closed. reported as nl.  \par ophtho - due.\par Has been gettting PSA.

## 2019-02-06 NOTE — HEALTH RISK ASSESSMENT
[Employed] : employed [] :  [No falls in past year] : Patient reported no falls in the past year [0] : 2) Feeling down, depressed, or hopeless: Not at all (0) [None] : None [With Family] : lives with family [Feels Safe at Home] : Feels safe at home [Fully functional (bathing, dressing, toileting, transferring, walking, feeding)] : Fully functional (bathing, dressing, toileting, transferring, walking, feeding) [Fully functional (using the telephone, shopping, preparing meals, housekeeping, doing laundry, using] : Fully functional and needs no help or supervision to perform IADLs (using the telephone, shopping, preparing meals, housekeeping, doing laundry, using transportation, managing medications and managing finances) [Smoke Detector] : smoke detector [Carbon Monoxide Detector] : carbon monoxide detector [Seat Belt] :  uses seat belt [Sunscreen] : uses sunscreen [] : No [SOM8Rhuec] : 0 [Change in mental status noted] : No change in mental status noted [Reports changes in hearing] : Reports no changes in hearing [Reports changes in vision] : Reports no changes in vision [Reports changes in dental health] : Reports no changes in dental health

## 2019-02-06 NOTE — PHYSICAL EXAM
[No Acute Distress] : no acute distress [Well Nourished] : well nourished [Well Developed] : well developed [Well-Appearing] : well-appearing [Normal Sclera/Conjunctiva] : normal sclera/conjunctiva [PERRL] : pupils equal round and reactive to light [EOMI] : extraocular movements intact [Normal Outer Ear/Nose] : the outer ears and nose were normal in appearance [Normal Oropharynx] : the oropharynx was normal [Normal TMs] : both tympanic membranes were normal [Normal Nasal Mucosa] : the nasal mucosa was normal [No JVD] : no jugular venous distention [Supple] : supple [No Lymphadenopathy] : no lymphadenopathy [Thyroid Normal, No Nodules] : the thyroid was normal and there were no nodules present [No Respiratory Distress] : no respiratory distress  [Clear to Auscultation] : lungs were clear to auscultation bilaterally [No Accessory Muscle Use] : no accessory muscle use [Normal Rate] : normal rate  [Regular Rhythm] : with a regular rhythm [Normal S1, S2] : normal S1 and S2 [No Murmur] : no murmur heard [No Carotid Bruits] : no carotid bruits [No Abdominal Bruit] : a ~M bruit was not heard ~T in the abdomen [Pedal Pulses Present] : the pedal pulses are present [No Palpable Aorta] : no palpable aorta [Soft] : abdomen soft [Non Tender] : non-tender [Non-distended] : non-distended [No Masses] : no abdominal mass palpated [No HSM] : no HSM [Normal Bowel Sounds] : normal bowel sounds [No Hernias] : no hernias [Penis Abnormality] : normal circumcised penis [Testes Tenderness] : no tenderness of the testes [Testes Mass (___cm)] : there were no testicular masses [Normal Posterior Cervical Nodes] : no posterior cervical lymphadenopathy [Normal Anterior Cervical Nodes] : no anterior cervical lymphadenopathy [Normal Inguinal Nodes] : no inguinal lymphadenopathy [No CVA Tenderness] : no CVA  tenderness [No Spinal Tenderness] : no spinal tenderness [No Joint Swelling] : no joint swelling [Grossly Normal Strength/Tone] : grossly normal strength/tone [No Rash] : no rash [Normal Gait] : normal gait [Coordination Grossly Intact] : coordination grossly intact [No Focal Deficits] : no focal deficits [Speech Grossly Normal] : speech grossly normal [Memory Grossly Normal] : memory grossly normal [Normal Affect] : the affect was normal [Alert and Oriented x3] : oriented to person, place, and time [Normal Mood] : the mood was normal [Normal Insight/Judgement] : insight and judgment were intact [de-identified] : tr le edema

## 2019-02-06 NOTE — REVIEW OF SYSTEMS
[Joint Stiffness] : joint stiffness [Negative] : Heme/Lymph [Itching] : no itching [Skin Rash] : no skin rash

## 2019-02-06 NOTE — ASSESSMENT
[FreeTextEntry1] : Discussed with the patient health care maintenance.  \par Discussed age and condition appropriate vaccinations.  \par Discussed age appropriate cancer screening testing as indicated, including colon cancer screening/colonoscopy, prostate cancer screening/PSA testing, testicular cancer screening/self exam and skin cancer screening.  \par Discussed protection from the sun.  \par Discussed healthy diet, exercise and weight control for health.\par Discussed healthy habits including abstaining from smoking and drugs and abstention/moderation with alcohol.\par Discussed routine regular ophthalmology and dental follow up.\par Routine labs discussed with the patient and sent. \par \par Cont rx for the le per orth.

## 2019-02-08 LAB
25(OH)D3 SERPL-MCNC: 17.1 NG/ML
ALBUMIN SERPL ELPH-MCNC: 4.2 G/DL
ALP BLD-CCNC: 84 U/L
ALT SERPL-CCNC: 11 U/L
ANION GAP SERPL CALC-SCNC: 14 MMOL/L
AST SERPL-CCNC: 19 U/L
BASOPHILS # BLD AUTO: 0.01 K/UL
BASOPHILS NFR BLD AUTO: 0.2 %
BILIRUB SERPL-MCNC: 1 MG/DL
BUN SERPL-MCNC: 15 MG/DL
CALCIUM SERPL-MCNC: 9.2 MG/DL
CHLORIDE SERPL-SCNC: 106 MMOL/L
CHOLEST SERPL-MCNC: 155 MG/DL
CHOLEST/HDLC SERPL: 3.8 RATIO
CO2 SERPL-SCNC: 24 MMOL/L
CREAT SERPL-MCNC: 0.91 MG/DL
EOSINOPHIL # BLD AUTO: 0.02 K/UL
EOSINOPHIL NFR BLD AUTO: 0.4 %
GLUCOSE SERPL-MCNC: 91 MG/DL
HBA1C MFR BLD HPLC: 4.9 %
HCT VFR BLD CALC: 40.4 %
HDLC SERPL-MCNC: 41 MG/DL
HGB BLD-MCNC: 12.8 G/DL
IMM GRANULOCYTES NFR BLD AUTO: 0 %
LDLC SERPL CALC-MCNC: 103 MG/DL
LYMPHOCYTES # BLD AUTO: 1.5 K/UL
LYMPHOCYTES NFR BLD AUTO: 32.5 %
MAN DIFF?: NORMAL
MCHC RBC-ENTMCNC: 27.5 PG
MCHC RBC-ENTMCNC: 31.7 GM/DL
MCV RBC AUTO: 86.7 FL
MONOCYTES # BLD AUTO: 0.31 K/UL
MONOCYTES NFR BLD AUTO: 6.7 %
NEUTROPHILS # BLD AUTO: 2.78 K/UL
NEUTROPHILS NFR BLD AUTO: 60.2 %
PLATELET # BLD AUTO: 247 K/UL
POTASSIUM SERPL-SCNC: 4.6 MMOL/L
PROT SERPL-MCNC: 7.5 G/DL
PSA SERPL-MCNC: 0.42 NG/ML
RBC # BLD: 4.66 M/UL
RBC # FLD: 12.2 %
SODIUM SERPL-SCNC: 143 MMOL/L
TESTOST SERPL-MCNC: 641.5 NG/DL
TRIGL SERPL-MCNC: 55 MG/DL
TSH SERPL-ACNC: 0.66 UIU/ML
WBC # FLD AUTO: 4.62 K/UL

## 2019-03-06 ENCOUNTER — NON-APPOINTMENT (OUTPATIENT)
Age: 54
End: 2019-03-06

## 2019-03-06 ENCOUNTER — APPOINTMENT (OUTPATIENT)
Dept: CARDIOLOGY | Facility: CLINIC | Age: 54
End: 2019-03-06
Payer: MEDICAID

## 2019-03-06 VITALS
OXYGEN SATURATION: 98 % | HEART RATE: 70 BPM | BODY MASS INDEX: 38.06 KG/M2 | WEIGHT: 281 LBS | SYSTOLIC BLOOD PRESSURE: 130 MMHG | DIASTOLIC BLOOD PRESSURE: 82 MMHG | HEIGHT: 72 IN

## 2019-03-06 DIAGNOSIS — Z51.81 ENCOUNTER FOR THERAPEUTIC DRUG LVL MONITORING: ICD-10-CM

## 2019-03-06 DIAGNOSIS — Z79.01 ENCOUNTER FOR THERAPEUTIC DRUG LVL MONITORING: ICD-10-CM

## 2019-03-06 PROCEDURE — 99214 OFFICE O/P EST MOD 30 MIN: CPT

## 2019-03-06 PROCEDURE — 93000 ELECTROCARDIOGRAM COMPLETE: CPT

## 2019-03-06 RX ORDER — AMOXICILLIN 500 MG/1
500 TABLET, FILM COATED ORAL
Qty: 4 | Refills: 0 | Status: DISCONTINUED | COMMUNITY
Start: 2019-03-04 | End: 2019-03-06

## 2019-03-06 RX ORDER — AMOXICILLIN 500 MG/1
500 TABLET, FILM COATED ORAL
Qty: 4 | Refills: 2 | Status: DISCONTINUED | COMMUNITY
Start: 2017-11-17 | End: 2019-03-06

## 2019-03-06 NOTE — HISTORY OF PRESENT ILLNESS
[FreeTextEntry1] : 54-year-old male status post left total hip arthroplasty, postoperatively patient was noted to be in atrial fibrillation likely due to discontinuation of his calcium channel blockers. He was restarted on diltiazem and continues his NOAC, no complaints noted. Doing yoga and isometrics for exercise regularly, no weight loss noted. He denies any chest pain, palpitations or shortness of breath.

## 2019-03-06 NOTE — DISCUSSION/SUMMARY
[Patient] : the patient [Paroxysmal Atrial Fibrillation] : paroxysmal atrial fibrillation [Stable] : stable [None] : none [___ Month(s)] : [unfilled] month(s) [FreeTextEntry1] : Paroxysmal AFib with current Sinus rhythm. Recurrent AFib, suggest continue CACB's for borederline BP's.\par Discussed need for weight loss. promised to lose 30 lbs over next year.

## 2019-03-06 NOTE — PHYSICAL EXAM
[General Appearance - Well Developed] : well developed [Normal Appearance] : normal appearance [Well Groomed] : well groomed [General Appearance - Well Nourished] : well nourished [No Deformities] : no deformities [General Appearance - In No Acute Distress] : no acute distress [Normal Conjunctiva] : the conjunctiva exhibited no abnormalities [Eyelids - No Xanthelasma] : the eyelids demonstrated no xanthelasmas [Normal Oral Mucosa] : normal oral mucosa [No Oral Pallor] : no oral pallor [No Oral Cyanosis] : no oral cyanosis [Normal Jugular Venous A Waves Present] : normal jugular venous A waves present [Normal Jugular Venous V Waves Present] : normal jugular venous V waves present [No Jugular Venous Burks A Waves] : no jugular venous burks A waves [Respiration, Rhythm And Depth] : normal respiratory rhythm and effort [Exaggerated Use Of Accessory Muscles For Inspiration] : no accessory muscle use [Auscultation Breath Sounds / Voice Sounds] : lungs were clear to auscultation bilaterally [Abdomen Soft] : soft [Abdomen Tenderness] : non-tender [Abdomen Mass (___ Cm)] : no abdominal mass palpated [Abnormal Walk] : normal gait [Gait - Sufficient For Exercise Testing] : the gait was sufficient for exercise testing [Nail Clubbing] : no clubbing of the fingernails [Cyanosis, Localized] : no localized cyanosis [Petechial Hemorrhages (___cm)] : no petechial hemorrhages [Skin Color & Pigmentation] : normal skin color and pigmentation [] : no rash [No Venous Stasis] : no venous stasis [Skin Lesions] : no skin lesions [No Skin Ulcers] : no skin ulcer [No Xanthoma] : no  xanthoma was observed [Oriented To Time, Place, And Person] : oriented to person, place, and time [Affect] : the affect was normal [Mood] : the mood was normal [No Anxiety] : not feeling anxious [5th Left ICS - MCL] : palpated at the 5th LICS in the midclavicular line [Normal] : normal [No Precordial Heave] : no precordial heave was noted [Tachycardia] : tachycardic [Heart Rate ___] : [unfilled] bpm [Rhythm Regular] : regular [Normal S2] : abnormal S2 [No Murmur] : no murmurs heard [No Abnormalities] : the abdominal aorta was not enlarged and no bruit was heard [No Pitting Edema] : no pitting edema present

## 2019-03-06 NOTE — CARDIOLOGY SUMMARY
[No Ischemia] : no Ischemia [No Exercise Ind Arr] : no exercise induced arrhythmias [No Symptoms] : no Symptoms [___] : [unfilled] [LVEF ___%] : LVEF [unfilled]% [___] : [unfilled]

## 2019-03-21 ENCOUNTER — APPOINTMENT (OUTPATIENT)
Dept: GASTROENTEROLOGY | Facility: CLINIC | Age: 54
End: 2019-03-21
Payer: MEDICAID

## 2019-03-21 VITALS
HEIGHT: 72 IN | WEIGHT: 281 LBS | SYSTOLIC BLOOD PRESSURE: 148 MMHG | DIASTOLIC BLOOD PRESSURE: 90 MMHG | BODY MASS INDEX: 38.06 KG/M2

## 2019-03-21 DIAGNOSIS — Z12.11 ENCOUNTER FOR SCREENING FOR MALIGNANT NEOPLASM OF COLON: ICD-10-CM

## 2019-03-21 PROCEDURE — 99205 OFFICE O/P NEW HI 60 MIN: CPT

## 2019-03-21 NOTE — ASSESSMENT
[FreeTextEntry1] : 1.  Encounter for colon cancer screening in average risk patient.  Previous colonoscopy more than 5 years ago.\par 2.  Obesity.\par 3.  Mild anemia.\par 4.  Atrial fibrillation on Xarelto.\par 5.  Status post bilateral knee and hip surgeries.\par \par Recs:\par - Recent labs reviewed, mild anemia in setting of recent orthopedic surgeries and Xarelto use.\par - Patient was advised to undergo colonoscopy- procedure, risks, benefits, and alternatives were explained. Patient agreeable. Brochure given. Miralax prep.\par - The patient was counseled on diet and exercise for weight loss.\par

## 2019-03-21 NOTE — HISTORY OF PRESENT ILLNESS
[Heartburn] : denies heartburn [Nausea] : denies nausea [Vomiting] : denies vomiting [Diarrhea] : denies diarrhea [Constipation] : denies constipation [Yellow Skin Or Eyes (Jaundice)] : denies jaundice [Abdominal Pain] : denies abdominal pain [Abdominal Swelling] : denies abdominal swelling [Rectal Pain] : denies rectal pain [de-identified] : Toni presents to the office today for evaluation for colon cancer screening.  \par \par He feels well from a GI perspective, and denies any dysphagia, nausea, abdominal pain, change in bowel habits, GI bleeding, weight loss, or family history of colon cancer.  He normally moves his bowels 2-3 times a day.  He reports a previous colonoscopy more than 5 years ago (records unavailable).\par \par Within the last year, he underwent bilateral hip and knee replacements.  He has a history of atrial fibrillation but does not take Xarelto consistently

## 2019-03-21 NOTE — REVIEW OF SYSTEMS
[Recent Weight Loss (___ Lbs)] : recent [unfilled] ~Ulb weight loss [As Noted in HPI] : as noted in HPI [Joint Pain] : joint pain [Joint Stiffness] : joint stiffness [Negative] : Heme/Lymph

## 2019-03-21 NOTE — CONSULT LETTER
[Dear  ___] : Dear  [unfilled], [Consult Letter:] : I had the pleasure of evaluating your patient, [unfilled]. [Please see my note below.] : Please see my note below. [Consult Closing:] : Thank you very much for allowing me to participate in the care of this patient.  If you have any questions, please do not hesitate to contact me. [Sincerely,] : Sincerely, [FreeTextEntry3] : Reuben Mathis MD\par Department of Gastroenterology\par [facility]\par [address] [place] [address], [facility] [address]\[place] [address]\par Tel: [phone]\par Email: [email or web address]

## 2019-03-21 NOTE — PHYSICAL EXAM
[General Appearance - Alert] : alert [General Appearance - In No Acute Distress] : in no acute distress [General Appearance - Well Nourished] : well nourished [Sclera] : the sclera and conjunctiva were normal [PERRL With Normal Accommodation] : pupils were equal in size, round, and reactive to light [Extraocular Movements] : extraocular movements were intact [Outer Ear] : the ears and nose were normal in appearance [Hearing Threshold Finger Rub Not [place]] : hearing was normal [Oropharynx] : the oropharynx was normal [Neck Appearance] : the appearance of the neck was normal [Auscultation Breath Sounds / Voice Sounds] : lungs were clear to auscultation bilaterally [Heart Rate And Rhythm] : heart rate was normal and rhythm regular [Heart Sounds] : normal S1 and S2 [Edema] : there was no peripheral edema [Bowel Sounds] : normal bowel sounds [Abdomen Soft] : soft [Abdomen Tenderness] : non-tender [] : no hepato-splenomegaly [Abdomen Mass (___ Cm)] : no abdominal mass palpated [Abdomen Hernia] : no hernia was discovered [Cervical Lymph Nodes Enlarged Anterior Bilaterally] : anterior cervical [Supraclavicular Lymph Nodes Enlarged Bilaterally] : supraclavicular [No CVA Tenderness] : no ~M costovertebral angle tenderness [No Spinal Tenderness] : no spinal tenderness [FreeTextEntry1] : walks with limp, scars on knees [Skin Color & Pigmentation] : normal skin color and pigmentation [Skin Turgor] : normal skin turgor [No Focal Deficits] : no focal deficits [Oriented To Time, Place, And Person] : oriented to person, place, and time

## 2019-03-25 ENCOUNTER — APPOINTMENT (OUTPATIENT)
Dept: OPHTHALMOLOGY | Facility: CLINIC | Age: 54
End: 2019-03-25
Payer: MEDICAID

## 2019-03-25 PROCEDURE — 76514 ECHO EXAM OF EYE THICKNESS: CPT

## 2019-03-25 PROCEDURE — 92004 COMPRE OPH EXAM NEW PT 1/>: CPT

## 2019-03-25 PROCEDURE — 92133 CPTRZD OPH DX IMG PST SGM ON: CPT

## 2019-03-29 ENCOUNTER — APPOINTMENT (OUTPATIENT)
Dept: GASTROENTEROLOGY | Facility: CLINIC | Age: 54
End: 2019-03-29
Payer: MEDICAID

## 2019-03-29 PROCEDURE — 45378 DIAGNOSTIC COLONOSCOPY: CPT

## 2019-05-06 ENCOUNTER — APPOINTMENT (OUTPATIENT)
Dept: OPHTHALMOLOGY | Facility: CLINIC | Age: 54
End: 2019-05-06
Payer: MEDICAID

## 2019-05-06 DIAGNOSIS — H40.003 PREGLAUCOMA, UNSPECIFIED, BILATERAL: ICD-10-CM

## 2019-05-06 PROCEDURE — 76514 ECHO EXAM OF EYE THICKNESS: CPT

## 2019-05-06 PROCEDURE — 92133 CPTRZD OPH DX IMG PST SGM ON: CPT

## 2019-05-06 PROCEDURE — ZZZZZ: CPT

## 2019-05-06 PROCEDURE — 92012 INTRM OPH EXAM EST PATIENT: CPT

## 2019-05-06 PROCEDURE — 92083 EXTENDED VISUAL FIELD XM: CPT

## 2019-05-06 RX ORDER — LATANOPROST/PF 0.005 %
0.01 DROPS OPHTHALMIC (EYE)
Qty: 1 | Refills: 3 | Status: ACTIVE | COMMUNITY
Start: 2019-05-06 | End: 1900-01-01

## 2019-06-12 NOTE — CONSULT NOTE ADULT - CONSULT REQUESTED DATE/TIME
Last visit 3/18/19  Next visit 6/21/19    Medication last prescribed 3/18/19 # 30 with 2 refills for Bupropion Wellbutrin  mg tablet taking 1 daily. 4/16/19 # 30 with 1 refill for the Sertraline Zoloft 50 mg tablet taking 1 daily.Verified prescription in Providers note.    Refill approved for # 30 with no refills.  
Medication:    Outpatient Medications Marked as Taking for the 6/11/19 encounter (Refill) with Laura Yahr Nelson, MD   Medication Sig Dispense Refill   • sertraline (ZOLOFT) 50 MG tablet Take 1 tablet by mouth daily. 30 tablet 1   • buPROPion (WELLBUTRIN XL) 150 MG 24 hr tablet Take 1 tablet by mouth daily. 30 tablet 2       Message to Prescriber: Patient is requesting a 90 day supply.     [x] Pharmacy has been verified.    [] Patient completely out of medication (*Route encounter as high priority if checked)    [x] Patient informed refill request can take up to 3 business days to be processed    Patient currently has follow up appointment scheduled          
18-May-2017 17:49

## 2019-06-19 ENCOUNTER — APPOINTMENT (OUTPATIENT)
Dept: OPHTHALMOLOGY | Facility: CLINIC | Age: 54
End: 2019-06-19

## 2019-09-09 ENCOUNTER — OTHER (OUTPATIENT)
Age: 54
End: 2019-09-09

## 2019-09-09 RX ORDER — AMOXICILLIN 500 MG/1
500 TABLET, FILM COATED ORAL
Qty: 4 | Refills: 2 | Status: ACTIVE | COMMUNITY
Start: 2019-09-09 | End: 1900-01-01

## 2020-04-29 ENCOUNTER — RECORDS - HEALTHEAST (OUTPATIENT)
Dept: LAB | Facility: CLINIC | Age: 55
End: 2020-04-29

## 2020-04-29 LAB
AST SERPL W P-5'-P-CCNC: 18 U/L (ref 0–40)
C REACTIVE PROTEIN LHE: 1 MG/DL (ref 0–0.8)
CREAT SERPL-MCNC: 0.92 MG/DL (ref 0.7–1.3)
ERYTHROCYTE [DISTWIDTH] IN BLOOD BY AUTOMATED COUNT: 14.6 % (ref 11–14.5)
GFR SERPL CREATININE-BSD FRML MDRD: >60 ML/MIN/1.73M2
HCT VFR BLD AUTO: 25.3 % (ref 40–54)
HGB BLD-MCNC: 7.8 G/DL (ref 14–18)
MCH RBC QN AUTO: 27.2 PG (ref 27–34)
MCHC RBC AUTO-ENTMCNC: 30.8 G/DL (ref 32–36)
MCV RBC AUTO: 88 FL (ref 80–100)
PLATELET # BLD AUTO: 445 THOU/UL (ref 140–440)
PMV BLD AUTO: 9.6 FL (ref 8.5–12.5)
RBC # BLD AUTO: 2.87 MILL/UL (ref 4.4–6.2)
WBC: 10.2 THOU/UL (ref 4–11)

## 2020-05-04 ENCOUNTER — RECORDS - HEALTHEAST (OUTPATIENT)
Dept: LAB | Facility: CLINIC | Age: 55
End: 2020-05-04

## 2020-05-04 LAB
AST SERPL W P-5'-P-CCNC: 21 U/L (ref 0–40)
C REACTIVE PROTEIN LHE: 1 MG/DL (ref 0–0.8)
CREAT SERPL-MCNC: 0.82 MG/DL (ref 0.7–1.3)
ERYTHROCYTE [DISTWIDTH] IN BLOOD BY AUTOMATED COUNT: 15 % (ref 11–14.5)
GFR SERPL CREATININE-BSD FRML MDRD: >60 ML/MIN/1.73M2
HCT VFR BLD AUTO: 26.9 % (ref 40–54)
HGB BLD-MCNC: 8.1 G/DL (ref 14–18)
MCH RBC QN AUTO: 26.1 PG (ref 27–34)
MCHC RBC AUTO-ENTMCNC: 30.1 G/DL (ref 32–36)
MCV RBC AUTO: 87 FL (ref 80–100)
PLATELET # BLD AUTO: 576 THOU/UL (ref 140–440)
PMV BLD AUTO: 9.4 FL (ref 8.5–12.5)
RBC # BLD AUTO: 3.1 MILL/UL (ref 4.4–6.2)
VANCOMYCIN SERPL-MCNC: 12.7 UG/ML
WBC: 16.2 THOU/UL (ref 4–11)

## 2020-05-27 ENCOUNTER — APPOINTMENT (OUTPATIENT)
Dept: CARDIOLOGY | Facility: CLINIC | Age: 55
End: 2020-05-27

## 2020-06-17 ENCOUNTER — APPOINTMENT (OUTPATIENT)
Dept: ORTHOPEDIC SURGERY | Facility: CLINIC | Age: 55
End: 2020-06-17
Payer: COMMERCIAL

## 2020-06-17 VITALS — TEMPERATURE: 97.3 F

## 2020-06-17 DIAGNOSIS — Z96.652 PRESENCE OF LEFT ARTIFICIAL KNEE JOINT: ICD-10-CM

## 2020-06-17 DIAGNOSIS — Z96.642 PRESENCE OF LEFT ARTIFICIAL HIP JOINT: ICD-10-CM

## 2020-06-17 DIAGNOSIS — Z96.641 PRESENCE OF RIGHT ARTIFICIAL HIP JOINT: ICD-10-CM

## 2020-06-17 DIAGNOSIS — M70.62 TROCHANTERIC BURSITIS, LEFT HIP: ICD-10-CM

## 2020-06-17 DIAGNOSIS — Z96.651 PRESENCE OF RIGHT ARTIFICIAL KNEE JOINT: ICD-10-CM

## 2020-06-17 PROCEDURE — 99214 OFFICE O/P EST MOD 30 MIN: CPT

## 2020-06-17 PROCEDURE — 72170 X-RAY EXAM OF PELVIS: CPT

## 2020-06-17 PROCEDURE — 73564 X-RAY EXAM KNEE 4 OR MORE: CPT | Mod: RT

## 2020-06-17 NOTE — HISTORY OF PRESENT ILLNESS
[de-identified] : L TKA 5/15/17. \par R TKA 1/30/17\par R BATOOL 5/26/16\par L BATOOL 9/29/16. \par \par 55 year old male presents today with c/o left hip pain. He reports 3 months of burning type pain in the lateral thigh radiating to the lateral knee he denies any groin pain numbness or tingling. He reports he decreased his exercise activity during covid pandemic

## 2020-06-17 NOTE — PHYSICAL EXAM
[de-identified] : Left knee exam\par Incision healing well no erythema\par Range of motion 0-90°\par Stable a/p/v/v stress\par calf soft nontender\par nvid. \par \par right knee exam\par Incision healing well no erythema\par Range of motion 0-90°\par Stable a/p/v/v stress\par calf soft nontender\par nvid. \par \par Right hip exam\par Incision Is well-healed no erythema\par No pain with hip range of motion\par 0-60\par nvid\par \par left hip exam\par Incision Is well-healed no erythema\par +ttp GT\par No pain with hip range of motion\par 0-60\par nvid\par  [de-identified] : \par The following radiographs were ordered and read by me during this patients visit. I reviewed each radiograph in detail with the patient and discussed the findings as highlighted below. \par \par 4 views of both knees were obtained today. His well-positioned knee replacements bilaterally there is no evidence of loosening.\par \par AP pelvis AP lateral left hip were obtained today. Her well-positioned hip replacements bilaterally. 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

## 2020-06-17 NOTE — DISCUSSION/SUMMARY
[de-identified] : 55-year-old male history of bilateral hip and knee replacement with iliotibial band tightness and trochanteric bursitis the left side. Anti-inflammatory medications. Physical therapy home exercise program iliotibial band stretching or strengthening. Followup 2 years

## 2021-05-13 NOTE — TELEPHONE ENCOUNTER
RECORDS RECEIVED FROM: Total replacement (R) hip pain DOS 8/28/20//[facility]   MRI 5/19 on hip will be pushed over    DATE RECEIVED: Jun 17, 2021     NOTES STATUS DETAILS   OFFICE NOTE from referring provider Care Everywhere    OFFICE NOTE from other specialist N/A    DISCHARGE SUMMARY from hospital N/A    DISCHARGE REPORT from the ER N/A    OPERATIVE REPORT N/A    MEDICATION LIST Internal    IMPLANT RECORD/STICKER N/A    LABS     CBC/DIFF N/A    CULTURES N/A    INJECTIONS DONE IN RADIOLOGY N/A    MRI In process    CT SCAN In process    XRAYS (IMAGES & REPORTS) In process    TUMOR     PATHOLOGY  Slides & report N/A    06/11/21   8:51 AM   IMAGES RESOLVED IN PACS  COMPLETE  Abby العلي CMA    06/09/21   9:30 AM   CALLED [facility] AGAIN FOR IMAGES THEY WILL BE PUSHED  SOME IMAGES DONE AT [facility], CALLED THEM AND ASKED FOR IMAGES TO BE PUSHED  Abby العلي CMA    05/13/21   2:57 PM   CALLED [facility] FOR LUMBAR SPINE/PELVIS/HIP IMAGES  Abby العلي CMA

## 2021-06-17 ENCOUNTER — OFFICE VISIT (OUTPATIENT)
Dept: ORTHOPEDICS | Facility: CLINIC | Age: 56
End: 2021-06-17
Payer: COMMERCIAL

## 2021-06-17 ENCOUNTER — PRE VISIT (OUTPATIENT)
Dept: ORTHOPEDICS | Facility: CLINIC | Age: 56
End: 2021-06-17

## 2021-06-17 ENCOUNTER — ANCILLARY PROCEDURE (OUTPATIENT)
Dept: GENERAL RADIOLOGY | Facility: CLINIC | Age: 56
End: 2021-06-17
Attending: ORTHOPAEDIC SURGERY
Payer: COMMERCIAL

## 2021-06-17 VITALS — BODY MASS INDEX: 40.43 KG/M2 | WEIGHT: 315 LBS | HEIGHT: 74 IN

## 2021-06-17 DIAGNOSIS — M25.551 RIGHT HIP PAIN: Primary | ICD-10-CM

## 2021-06-17 PROCEDURE — 99204 OFFICE O/P NEW MOD 45 MIN: CPT | Performed by: ORTHOPAEDIC SURGERY

## 2021-06-17 PROCEDURE — 73502 X-RAY EXAM HIP UNI 2-3 VIEWS: CPT | Mod: RT | Performed by: RADIOLOGY

## 2021-06-17 RX ORDER — DULOXETIN HYDROCHLORIDE 30 MG/1
CAPSULE, DELAYED RELEASE ORAL
COMMUNITY
Start: 2021-05-23

## 2021-06-17 RX ORDER — TRAZODONE HYDROCHLORIDE 50 MG/1
TABLET, FILM COATED ORAL
COMMUNITY
Start: 2021-03-10

## 2021-06-17 RX ORDER — ALPRAZOLAM 0.5 MG
TABLET ORAL
COMMUNITY
Start: 2021-05-18

## 2021-06-17 RX ORDER — ATORVASTATIN CALCIUM 80 MG/1
80 TABLET, FILM COATED ORAL
COMMUNITY
Start: 2021-04-26

## 2021-06-17 RX ORDER — OXYCODONE HYDROCHLORIDE 5 MG/1
5 TABLET ORAL
COMMUNITY
Start: 2021-05-12

## 2021-06-17 RX ORDER — HYDROXYZINE PAMOATE 25 MG/1
CAPSULE ORAL
COMMUNITY
Start: 2021-05-26

## 2021-06-17 RX ORDER — NITROGLYCERIN 0.4 MG/1
TABLET SUBLINGUAL
COMMUNITY
Start: 2021-04-21

## 2021-06-17 RX ORDER — TAMSULOSIN HYDROCHLORIDE 0.4 MG/1
0.4 CAPSULE ORAL
COMMUNITY
Start: 2021-02-15

## 2021-06-17 RX ORDER — PREGABALIN 100 MG/1
CAPSULE ORAL
COMMUNITY
Start: 2021-05-03

## 2021-06-17 RX ORDER — ERGOCALCIFEROL 1.25 MG/1
CAPSULE, LIQUID FILLED ORAL
COMMUNITY
Start: 2021-04-18

## 2021-06-17 RX ORDER — POLYETHYLENE GLYCOL 3350 17 G/17G
POWDER, FOR SOLUTION ORAL
COMMUNITY
Start: 2020-09-01

## 2021-06-17 RX ORDER — METHOCARBAMOL 500 MG/1
TABLET, FILM COATED ORAL
COMMUNITY
Start: 2021-02-15

## 2021-06-17 RX ORDER — LISINOPRIL 20 MG/1
20 TABLET ORAL
COMMUNITY
Start: 2021-05-12

## 2021-06-17 RX ORDER — FERROUS GLUCONATE 324(38)MG
1 TABLET ORAL
COMMUNITY
Start: 2020-11-10

## 2021-06-17 RX ORDER — BUMETANIDE 1 MG/1
TABLET ORAL
COMMUNITY
Start: 2021-02-15

## 2021-06-17 RX ORDER — BUPROPION HYDROCHLORIDE 300 MG/1
300 TABLET ORAL
COMMUNITY
Start: 2021-05-12

## 2021-06-17 ASSESSMENT — HOOS JR
WALKING ON UNEVEN SURFACE: EXTREME
LYING IN BED (TURNING OVER, MAINTAINING HIP POSITION): MODERATE
HOOS JR TOTAL INTERVAL SCORE: 29.01
RISING FROM SITTING: SEVERE
SITTING: MODERATE
BENDING TO THE FLOOR TO PICK UP OBJECT: EXTREME
GOING UP OR DOWN STAIRS: EXTREME

## 2021-06-17 ASSESSMENT — MIFFLIN-ST. JEOR: SCORE: 2641.77

## 2021-06-17 NOTE — LETTER
6/17/2021         RE: Nicholas Bowen  [address] [facility] [address]        Dear Colleague,    Thank you for referring your patient, Nicholas Bowen, to the [facility] [place]. Please see a copy of my visit note below.    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 He reports that he has remained symptomatic since surgery. Imaging is consistent with a stable implant. Differential ranges from decrease in patient outcomes typical of revision total hip and infected total hip. Examination may be consistent with the implant subluxing in a more extreme position and this is consistent with his description of a feeling of instability and pain in hyper-flexion with corporeal impingement between abdomen and thigh. If that it is the case repeated subluxation may be the explanation of these symptoms. Should that be the case weight loss may be required to address this.      Plan:  SED/CRP. Will call with results.     Chief Complaint: RECHECK (Right hip pain after Right PEREZ on 8/28/20 patient fell off some steps at work in 2019 which eventually lead to him loosing 75 lbs he has had 3 surgeries on his hip in the last year. after that he had some issues with bleeding while on xeralto medication. lateral hip and groin pain.  )      Physician:  No ref. provider found    HPI: Nicholas Bowen is a 56 year old male who presents today for evaluation of his revision right total hip performed at [facility]    Symptom Profile  Location of symptoms:  Groin and buttock   Onset: post-op in Feb  Trend: getting   Duration of symptoms:  Quality of symptoms: aching, sharp/stabbing in certain position   Severity:severe at times   Alleviate:activity modificaiton   Exacerbating: hyperflexion, FAIDR, walking for distance   Previous Treatments: Previous treatments include activity modification,  oral pain medication, had a previous infection screen for same that was normal     HOOS Jr: 29.01%    MEDICAL HISTORY: History reviewed. No pertinent past medical history.    Medications:     Current Outpatient Medications:      aspirin (ASA) 81 MG EC tablet, Take 81 mg by mouth, Disp: , Rfl:      atorvastatin (LIPITOR) 80 MG tablet, Take 80 mg by mouth, Disp: , Rfl:      bumetanide (BUMEX) 1 MG tablet, Take 1 mg by mouth daily. May take an additional 1 mg daily as needed., Disp: , Rfl:      buPROPion (WELLBUTRIN XL) 300 MG 24 hr tablet, Take 300 mg by mouth, Disp: , Rfl:      DULoxetine (CYMBALTA) 30 MG capsule, , Disp: , Rfl:      ferrous gluconate (FERGON) 324 (38 Fe) MG tablet, Take 1 tablet by mouth, Disp: , Rfl:      hydrOXYzine (VISTARIL) 25 MG capsule, TAKE 2 CAPSULES BY MOUTH FOUR TIMES DAILY AS NEEDED. MAY USE TO BOOST PAIN MED EFFECTS OR FOR ITCHING, Disp: , Rfl:      lisinopril (ZESTRIL) 20 MG tablet, Take 20 mg by mouth, Disp: , Rfl:      metFORMIN (GLUCOPHAGE) 1000 MG tablet, , Disp: , Rfl:      nitroGLYcerin (NITROSTAT) 0.4 MG sublingual tablet, , Disp: , Rfl:      oxyCODONE (ROXICODONE) 5 MG tablet, Take 5 mg by mouth, Disp: , Rfl:      polyethylene glycol (MIRALAX) 17 GM/Dose powder, , Disp: , Rfl:      polyethylene glycol-propylene glycol (SYSTANE ULTRA) 0.4-0.3 % SOLN ophthalmic solution, Apply 1-2 drops to eye, Disp: , Rfl:      rivaroxaban ANTICOAGULANT (XARELTO) 10 MG TABS tablet, Take 10 mg by mouth, Disp: , Rfl:      tamsulosin (FLOMAX) 0.4 MG capsule, Take 0.4 mg by mouth, Disp: , Rfl:      traZODone (DESYREL) 50 MG tablet, , Disp: , Rfl:      ALPRAZolam (XANAX) 0.5 MG tablet, , Disp: , Rfl:      melatonin 5 MG tablet, TAKE 1 TABLET BY MOUTH AT BEDTIME FOR INSOMNIA, Disp: , Rfl:      methocarbamol (ROBAXIN) 500 MG tablet, TAKE 1 TO 2 TABLETS BY MOUTH 1 TIME FOR 1 DOSE AS DIRECTED, Disp: , Rfl:      pregabalin (LYRICA) 100 MG capsule, TAKE UP TO THREE TIMES DAILY. CAN TAKE 2 CAPSULES AT  "BEDTIME, Disp: , Rfl:      vitamin D2 (ERGOCALCIFEROL) 83379 units (1250 mcg) capsule, TAKE 1 CAPSULE BY MOUTH EVERY MONDAY AND THURSDAY, Disp: , Rfl:     Allergies: Cefazolin, Succinylcholine, Sulfamethoxazole-trimethoprim, and Acetaminophen    SURGICAL HISTORY: History reviewed. No pertinent surgical history.    FAMILY HISTORY: History reviewed. No pertinent family history.    SOCIAL HISTORY:   Social History     Tobacco Use     Smoking status: Never Smoker     Smokeless tobacco: Never Used   Substance Use Topics     Alcohol use: Not on file       REVIEW OF SYSTEMS:  The comprehensive review of systems from the intake form was reviewed with the patient.  No fever, weight change or fatigue. No dry eyes. No oral ulcers, sore throat or voice change. No palpitations, syncope, angina or edema.  No chest pain, excessive sleepiness, shortness of breath or hemoptysis.   No abdominal pain, nausea, vomiting, diarrhea or heartburn.  No skin rash. No focal weakness or numbness. No bleeding or lymphadenopathy. No rhinitis or hives.     Exam:  On physical examination the patient appears the stated age, is in no acute distress, affectThe is appropriate, and breathing is non-labored.  Vitals are documented in the EMR and have been reviewed:    Ht 1.867 m (6' 1.5\")   Wt (!) 176 kg (388 lb)   BMI 50.50 kg/m    6' 1.5\"  Body mass index is 50.5 kg/m .    Rises from chair: with effort   Gait: near normal   Trendelenburg test:  Gains the exam table:    RIGHT hip subjective:not irritated   Motion is fluid and painless until maximum IRF with a sharp pain and only in that position.   Distally, the circulatory, motor, and sensation exam is intact with 5/5 EHL, gastroc-soleus, and tibialis anterior.  Sensation to light touch is intact.  Dorsalis pedis and posterior tibialis pulses are not palpable at least in part due to habitus but the foot is warm and well perfused.  There are no sores on the feet, no bruising, and pitting lymphedema to " mid shin.    X-rays:   Stable appearing right total hip arthroplasty     45 minutes were spent on the patient's visit today.         Again, thank you for allowing me to participate in the care of your patient.        Sincerely,        Gigi Hammond MD

## 2021-06-17 NOTE — LETTER
Date:July 28, 2021      Patient was self referred, no letter generated. Do not send.        [facility] Information

## 2021-06-17 NOTE — PROGRESS NOTES
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 He reports that he has remained symptomatic since surgery. Imaging is consistent with a stable implant. Differential ranges from decrease in patient outcomes typical of revision total hip and infected total hip. Examination may be consistent with the implant subluxing in a more extreme position and this is consistent with his description of a feeling of instability and pain in hyper-flexion with corporeal impingement between abdomen and thigh. If that it is the case repeated subluxation may be the explanation of these symptoms. Should that be the case weight loss may be required to address this.      Plan:  SED/CRP. Will call with results.     Chief Complaint: RECHECK (Right hip pain after Right PEREZ on 8/28/20 patient fell off some steps at work in 2019 which eventually lead to him loosing 75 lbs he has had 3 surgeries on his hip in the last year. after that he had some issues with bleeding while on xeralto medication. lateral hip and groin pain.  )      Physician:  No ref. provider found    HPI: Nicholas Bowen is a 56 year old male who presents today for evaluation of his revision right total hip performed at [facility]    Symptom Profile  Location of symptoms:  Groin and buttock   Onset: post-op in Feb  Trend: getting   Duration of symptoms:  Quality of symptoms: aching, sharp/stabbing in certain position   Severity:severe at times   Alleviate:activity modificaiton   Exacerbating: hyperflexion, FAIDR, walking for distance   Previous Treatments: Previous treatments include activity modification, oral pain medication, had a previous infection screen for same that was normal     HOOS Jr: 29.01%    MEDICAL HISTORY: History reviewed. No pertinent past medical history.    Medications:     Current Outpatient Medications:      aspirin (ASA) 81 MG EC tablet, Take 81 mg by  mouth, Disp: , Rfl:      atorvastatin (LIPITOR) 80 MG tablet, Take 80 mg by mouth, Disp: , Rfl:      bumetanide (BUMEX) 1 MG tablet, Take 1 mg by mouth daily. May take an additional 1 mg daily as needed., Disp: , Rfl:      buPROPion (WELLBUTRIN XL) 300 MG 24 hr tablet, Take 300 mg by mouth, Disp: , Rfl:      DULoxetine (CYMBALTA) 30 MG capsule, , Disp: , Rfl:      ferrous gluconate (FERGON) 324 (38 Fe) MG tablet, Take 1 tablet by mouth, Disp: , Rfl:      hydrOXYzine (VISTARIL) 25 MG capsule, TAKE 2 CAPSULES BY MOUTH FOUR TIMES DAILY AS NEEDED. MAY USE TO BOOST PAIN MED EFFECTS OR FOR ITCHING, Disp: , Rfl:      lisinopril (ZESTRIL) 20 MG tablet, Take 20 mg by mouth, Disp: , Rfl:      metFORMIN (GLUCOPHAGE) 1000 MG tablet, , Disp: , Rfl:      nitroGLYcerin (NITROSTAT) 0.4 MG sublingual tablet, , Disp: , Rfl:      oxyCODONE (ROXICODONE) 5 MG tablet, Take 5 mg by mouth, Disp: , Rfl:      polyethylene glycol (MIRALAX) 17 GM/Dose powder, , Disp: , Rfl:      polyethylene glycol-propylene glycol (SYSTANE ULTRA) 0.4-0.3 % SOLN ophthalmic solution, Apply 1-2 drops to eye, Disp: , Rfl:      rivaroxaban ANTICOAGULANT (XARELTO) 10 MG TABS tablet, Take 10 mg by mouth, Disp: , Rfl:      tamsulosin (FLOMAX) 0.4 MG capsule, Take 0.4 mg by mouth, Disp: , Rfl:      traZODone (DESYREL) 50 MG tablet, , Disp: , Rfl:      ALPRAZolam (XANAX) 0.5 MG tablet, , Disp: , Rfl:      melatonin 5 MG tablet, TAKE 1 TABLET BY MOUTH AT BEDTIME FOR INSOMNIA, Disp: , Rfl:      methocarbamol (ROBAXIN) 500 MG tablet, TAKE 1 TO 2 TABLETS BY MOUTH 1 TIME FOR 1 DOSE AS DIRECTED, Disp: , Rfl:      pregabalin (LYRICA) 100 MG capsule, TAKE UP TO THREE TIMES DAILY. CAN TAKE 2 CAPSULES AT BEDTIME, Disp: , Rfl:      vitamin D2 (ERGOCALCIFEROL) 74446 units (1250 mcg) capsule, TAKE 1 CAPSULE BY MOUTH EVERY MONDAY AND THURSDAY, Disp: , Rfl:     Allergies: Cefazolin, Succinylcholine, Sulfamethoxazole-trimethoprim, and Acetaminophen    SURGICAL HISTORY: History  "reviewed. No pertinent surgical history.    FAMILY HISTORY: History reviewed. No pertinent family history.    SOCIAL HISTORY:   Social History     Tobacco Use     Smoking status: Never Smoker     Smokeless tobacco: Never Used   Substance Use Topics     Alcohol use: Not on file       REVIEW OF SYSTEMS:  The comprehensive review of systems from the intake form was reviewed with the patient.  No fever, weight change or fatigue. No dry eyes. No oral ulcers, sore throat or voice change. No palpitations, syncope, angina or edema.  No chest pain, excessive sleepiness, shortness of breath or hemoptysis.   No abdominal pain, nausea, vomiting, diarrhea or heartburn.  No skin rash. No focal weakness or numbness. No bleeding or lymphadenopathy. No rhinitis or hives.     Exam:  On physical examination the patient appears the stated age, is in no acute distress, affectThe is appropriate, and breathing is non-labored.  Vitals are documented in the EMR and have been reviewed:    Ht 1.867 m (6' 1.5\")   Wt (!) 176 kg (388 lb)   BMI 50.50 kg/m    6' 1.5\"  Body mass index is 50.5 kg/m .    Rises from chair: with effort   Gait: near normal   Trendelenburg test:  Gains the exam table:    RIGHT hip subjective:not irritated   Motion is fluid and painless until maximum IRF with a sharp pain and only in that position.   Distally, the circulatory, motor, and sensation exam is intact with 5/5 EHL, gastroc-soleus, and tibialis anterior.  Sensation to light touch is intact.  Dorsalis pedis and posterior tibialis pulses are not palpable at least in part due to habitus but the foot is warm and well perfused.  There are no sores on the feet, no bruising, and pitting lymphedema to mid shin.    X-rays:   Stable appearing right total hip arthroplasty     45 minutes were spent on the patient's visit today.     "

## 2021-06-17 NOTE — LETTER
[facility]  [facility]  [address], [address]  [place] [address]  Fax: [phone]  Phone: [phone]      2021      Nicholas Bowen  [address] [facility] [address]        Dear Nicholas Bowen,    Thank you for your interest in becoming a [employer] user!    Your access code is: [email or web address]  Expires: 2021  6:33 AM     Please access the [employer] website at [email or web address].  Below the ID and password fields, select the  Sign Up Now  as New User.  You will be prompted to enter the access code listed above as well as additional personal information.  Please follow the directions carefully when creating your username and password.    If you allow your access code to , or if you have any questions please call a [employer] Representative at [phone] during normal clinic hours.     Sincerely,      [facility]  [facility]

## 2021-06-17 NOTE — NURSING NOTE
"Reason For Visit:   Chief Complaint   Patient presents with     RECHECK     Right hip pain after Right PEREZ on 8/28/20 patient fell off some steps at work in 2019 which eventually lead to him loosing 75 lbs he has had 3 surgeries on his hip in the last year. after that he had some issues with bleeding while on xeralto medication. lateral hip and groin pain.         Ht 1.867 m (6' 1.5\")   Wt (!) 176 kg (388 lb)   BMI 50.50 kg/m           Emile Buchanan, PATTI  "

## 2021-06-23 ENCOUNTER — TELEPHONE (OUTPATIENT)
Dept: ORTHOPEDICS | Facility: CLINIC | Age: 56
End: 2021-06-23

## 2021-06-23 NOTE — TELEPHONE ENCOUNTER
Faxed orders to number provided. rightfax marked as received/sent.     Called and spoke to Prema. Informed Prema that orders have been sent. Prema appreciated call.     Ailyn Gutierrez ATC

## 2021-06-23 NOTE — TELEPHONE ENCOUNTER
[facility] Call Center    Phone Message    May a detailed message be left on voicemail: yes     Reason for Call: Order(s): Other:   Reason for requested: labs  Date needed:TODAY  Provider name: Dr. Manish Lloyd from [facility] looking for lab orders as patient is there right NOW.    Please fax to Prema at [phone].      Action Taken: Message routed to:  [facility] ([facility] ortho    Travel Screening: Not Applicable

## 2021-06-24 ENCOUNTER — TRANSFERRED RECORDS (OUTPATIENT)
Dept: HEALTH INFORMATION MANAGEMENT | Facility: CLINIC | Age: 56
End: 2021-06-24

## 2021-08-15 ENCOUNTER — HEALTH MAINTENANCE LETTER (OUTPATIENT)
Age: 56
End: 2021-08-15

## 2021-10-11 ENCOUNTER — HEALTH MAINTENANCE LETTER (OUTPATIENT)
Age: 56
End: 2021-10-11

## 2021-10-11 NOTE — PATIENT PROFILE ADULT. - ABILITY TO HEAR (WITH HEARING AID OR HEARING APPLIANCE IF NORMALLY USED):
Adequate: hears normal conversation without difficulty No Full range of motion of upper and lower extremities, no joint tenderness/swelling.

## 2022-01-19 NOTE — PHYSICAL THERAPY INITIAL EVALUATION ADULT - PLANNED THERAPY INTERVENTIONS, PT EVAL
strengthening/stretching/gait training/balance training/transfer training/ROM/bed mobility training
no

## 2022-05-12 ENCOUNTER — NON-APPOINTMENT (OUTPATIENT)
Age: 57
End: 2022-05-12

## 2022-05-12 ENCOUNTER — APPOINTMENT (OUTPATIENT)
Dept: INTERNAL MEDICINE | Facility: CLINIC | Age: 57
End: 2022-05-12
Payer: COMMERCIAL

## 2022-05-12 VITALS
BODY MASS INDEX: 32.55 KG/M2 | HEART RATE: 73 BPM | RESPIRATION RATE: 16 BRPM | WEIGHT: 240 LBS | OXYGEN SATURATION: 98 % | SYSTOLIC BLOOD PRESSURE: 130 MMHG | DIASTOLIC BLOOD PRESSURE: 86 MMHG

## 2022-05-12 DIAGNOSIS — E55.9 VITAMIN D DEFICIENCY, UNSPECIFIED: ICD-10-CM

## 2022-05-12 DIAGNOSIS — E66.9 OBESITY, UNSPECIFIED: ICD-10-CM

## 2022-05-12 DIAGNOSIS — M17.0 BILATERAL PRIMARY OSTEOARTHRITIS OF KNEE: ICD-10-CM

## 2022-05-12 DIAGNOSIS — Z23 ENCOUNTER FOR IMMUNIZATION: ICD-10-CM

## 2022-05-12 DIAGNOSIS — M16.0 BILATERAL PRIMARY OSTEOARTHRITIS OF HIP: ICD-10-CM

## 2022-05-12 PROCEDURE — 99386 PREV VISIT NEW AGE 40-64: CPT | Mod: 25

## 2022-05-12 PROCEDURE — 99072 ADDL SUPL MATRL&STAF TM PHE: CPT

## 2022-05-12 PROCEDURE — 86580 TB INTRADERMAL TEST: CPT

## 2022-05-12 NOTE — PHYSICAL EXAM
[No Acute Distress] : no acute distress [Well Nourished] : well nourished [Well Developed] : well developed [Well-Appearing] : well-appearing [Normal Sclera/Conjunctiva] : normal sclera/conjunctiva [PERRL] : pupils equal round and reactive to light [EOMI] : extraocular movements intact [Normal Outer Ear/Nose] : the outer ears and nose were normal in appearance [Normal TMs] : both tympanic membranes were normal [No JVD] : no jugular venous distention [No Lymphadenopathy] : no lymphadenopathy [Supple] : supple [Thyroid Normal, No Nodules] : the thyroid was normal and there were no nodules present [No Respiratory Distress] : no respiratory distress  [No Accessory Muscle Use] : no accessory muscle use [Clear to Auscultation] : lungs were clear to auscultation bilaterally [Normal Rate] : normal rate  [Regular Rhythm] : with a regular rhythm [Normal S1, S2] : normal S1 and S2 [No Murmur] : no murmur heard [No Carotid Bruits] : no carotid bruits [No Abdominal Bruit] : a ~M bruit was not heard ~T in the abdomen [Pedal Pulses Present] : the pedal pulses are present [No Edema] : there was no peripheral edema [No Palpable Aorta] : no palpable aorta [Soft] : abdomen soft [Non Tender] : non-tender [Non-distended] : non-distended [No Masses] : no abdominal mass palpated [No HSM] : no HSM [Normal Bowel Sounds] : normal bowel sounds [Normal Posterior Cervical Nodes] : no posterior cervical lymphadenopathy [Normal Anterior Cervical Nodes] : no anterior cervical lymphadenopathy [No CVA Tenderness] : no CVA  tenderness [No Spinal Tenderness] : no spinal tenderness [No Joint Swelling] : no joint swelling [Grossly Normal Strength/Tone] : grossly normal strength/tone [No Rash] : no rash [Coordination Grossly Intact] : coordination grossly intact [No Focal Deficits] : no focal deficits [Normal Gait] : normal gait [Speech Grossly Normal] : speech grossly normal [Memory Grossly Normal] : memory grossly normal [Normal Affect] : the affect was normal [Alert and Oriented x3] : oriented to person, place, and time [Normal Mood] : the mood was normal [Normal Insight/Judgement] : insight and judgment were intact

## 2022-05-12 NOTE — HEALTH RISK ASSESSMENT
[Never] : Never [No] : In the past 12 months have you used drugs other than those required for medical reasons? No [No falls in past year] : Patient reported no falls in the past year [0] : 2) Feeling down, depressed, or hopeless: Not at all (0) [PHQ-2 Negative - No further assessment needed] : PHQ-2 Negative - No further assessment needed [None] : None [With Family] : lives with family [Employed] : employed [] :  [Feels Safe at Home] : Feels safe at home [Fully functional (bathing, dressing, toileting, transferring, walking, feeding)] : Fully functional (bathing, dressing, toileting, transferring, walking, feeding) [Fully functional (using the telephone, shopping, preparing meals, housekeeping, doing laundry, using] : Fully functional and needs no help or supervision to perform IADLs (using the telephone, shopping, preparing meals, housekeeping, doing laundry, using transportation, managing medications and managing finances) [Smoke Detector] : smoke detector [Carbon Monoxide Detector] : carbon monoxide detector [Seat Belt] :  uses seat belt [Sunscreen] : uses sunscreen [Audit-CScore] : 0 [EXC1Emceb] : 0 [Change in mental status noted] : No change in mental status noted [Reports changes in hearing] : Reports no changes in hearing [Reports changes in vision] : Reports no changes in vision [Reports changes in dental health] : Reports no changes in dental health

## 2022-05-12 NOTE — HISTORY OF PRESENT ILLNESS
[FreeTextEntry1] : annual PE [de-identified] : Pt seen during EMR downtime.  Last seen > 3 years ago.  \par Denied PMHx when he was seen.\par \par \par Pt is a 56 y/o male with a hx of paf, obesity, s/p bilat tka, kelly \par For annual PE\par No longer with hip/back pain -   \par no noted CV sx.  Has not been taking the rx for the af.\par Feeling well.\par \par needs PPD for form\par \par tet - up to date.  '13\par Declines flu vaccine\par \par colon - '13 - ? when he was supposed to f/u.  GI office has since closed. reported as nl.  \par ophtho - due.\par Has been gettting PSA.

## 2022-05-12 NOTE — ASSESSMENT
[FreeTextEntry1] : Discussed with the patient health care maintenance.  \par Discussed age and condition appropriate vaccinations.  \par Discussed age appropriate cancer screening testing as indicated, including colon cancer screening/colonoscopy, prostate cancer screening/PSA testing, testicular cancer screening/self exam and skin cancer screening.  \par Discussed protection from the sun.  \par Discussed healthy diet, exercise and weight control for health.\par Discussed healthy habits including abstaining from smoking and drugs and abstention/moderation with alcohol.\par Discussed routine regular ophthalmology and dental follow up.\par Routine labs discussed with the patient and sent.  He wants to get labs and EKG when he comes in for the PPD reading.

## 2022-05-13 ENCOUNTER — NON-APPOINTMENT (OUTPATIENT)
Age: 57
End: 2022-05-13

## 2022-05-15 ENCOUNTER — APPOINTMENT (OUTPATIENT)
Dept: INTERNAL MEDICINE | Facility: CLINIC | Age: 57
End: 2022-05-15
Payer: COMMERCIAL

## 2022-05-15 ENCOUNTER — NON-APPOINTMENT (OUTPATIENT)
Age: 57
End: 2022-05-15

## 2022-05-15 VITALS
SYSTOLIC BLOOD PRESSURE: 122 MMHG | DIASTOLIC BLOOD PRESSURE: 80 MMHG | HEIGHT: 72 IN | WEIGHT: 240 LBS | OXYGEN SATURATION: 98 % | BODY MASS INDEX: 32.51 KG/M2 | HEART RATE: 68 BPM

## 2022-05-15 DIAGNOSIS — Z00.00 ENCOUNTER FOR GENERAL ADULT MEDICAL EXAMINATION W/OUT ABNORMAL FINDINGS: ICD-10-CM

## 2022-05-15 DIAGNOSIS — Z11.1 ENCOUNTER FOR SCREENING FOR RESPIRATORY TUBERCULOSIS: ICD-10-CM

## 2022-05-15 DIAGNOSIS — Z86.79 PERSONAL HISTORY OF OTHER DISEASES OF THE CIRCULATORY SYSTEM: ICD-10-CM

## 2022-05-15 PROCEDURE — 36415 COLL VENOUS BLD VENIPUNCTURE: CPT

## 2022-05-15 PROCEDURE — 99072 ADDL SUPL MATRL&STAF TM PHE: CPT

## 2022-05-15 PROCEDURE — 93000 ELECTROCARDIOGRAM COMPLETE: CPT

## 2022-05-15 PROCEDURE — 99396 PREV VISIT EST AGE 40-64: CPT | Mod: 25

## 2022-05-26 LAB
25(OH)D3 SERPL-MCNC: 127 NG/ML
ALBUMIN SERPL ELPH-MCNC: 4.2 G/DL
ALP BLD-CCNC: 79 U/L
ALT SERPL-CCNC: 14 U/L
ANION GAP SERPL CALC-SCNC: 11 MMOL/L
AST SERPL-CCNC: 19 U/L
BASOPHILS # BLD AUTO: 0.01 K/UL
BASOPHILS NFR BLD AUTO: 0.3 %
BILIRUB SERPL-MCNC: 1.2 MG/DL
BUN SERPL-MCNC: 16 MG/DL
CALCIUM SERPL-MCNC: 9.1 MG/DL
CHLORIDE SERPL-SCNC: 105 MMOL/L
CHOLEST SERPL-MCNC: 165 MG/DL
CO2 SERPL-SCNC: 26 MMOL/L
CREAT SERPL-MCNC: 0.93 MG/DL
EGFR: 95 ML/MIN/1.73M2
EOSINOPHIL # BLD AUTO: 0.09 K/UL
EOSINOPHIL NFR BLD AUTO: 2.9 %
ESTIMATED AVERAGE GLUCOSE: 97 MG/DL
GLUCOSE SERPL-MCNC: 95 MG/DL
HBA1C MFR BLD HPLC: 5 %
HCT VFR BLD CALC: 39.5 %
HDLC SERPL-MCNC: 48 MG/DL
HGB BLD-MCNC: 12.5 G/DL
IMM GRANULOCYTES NFR BLD AUTO: 0 %
LDLC SERPL CALC-MCNC: 109 MG/DL
LYMPHOCYTES # BLD AUTO: 1.03 K/UL
LYMPHOCYTES NFR BLD AUTO: 33.2 %
MAGNESIUM SERPL-MCNC: 2 MG/DL
MAN DIFF?: NORMAL
MCHC RBC-ENTMCNC: 28.5 PG
MCHC RBC-ENTMCNC: 31.6 GM/DL
MCV RBC AUTO: 90.2 FL
MONOCYTES # BLD AUTO: 0.35 K/UL
MONOCYTES NFR BLD AUTO: 11.3 %
NEUTROPHILS # BLD AUTO: 1.62 K/UL
NEUTROPHILS NFR BLD AUTO: 52.3 %
NONHDLC SERPL-MCNC: 117 MG/DL
PLATELET # BLD AUTO: 206 K/UL
POTASSIUM SERPL-SCNC: 4.6 MMOL/L
PROT SERPL-MCNC: 7.3 G/DL
RBC # BLD: 4.38 M/UL
RBC # FLD: 11.9 %
SODIUM SERPL-SCNC: 141 MMOL/L
TRIGL SERPL-MCNC: 40 MG/DL
TSH SERPL-ACNC: 0.54 UIU/ML
WBC # FLD AUTO: 3.1 K/UL

## 2022-07-31 NOTE — H&P PST ADULT - PROBLEM/PLAN-4
Thoracic Surgery Progress Note     Operations:   7/23 R VATS, drainage of empyema, decortication     Subjective:      POD #8. No acute events overnight, afebrile, and VSS. Pt reports that pain is controlled.     No complaints of fever/chills, nausea/vomiting, or SOB. Tolerating diet and passing gas and having BMs. Ambulating without any issues.      Objective:   Vitals:    07/31/22 0200 07/31/22 0300 07/31/22 0400 07/31/22 0800   BP: (!) 89/56 98/59 93/58    Pulse: 94 88 91    Resp: (!) 24 (!) 23 (!) 26    Temp:   98.6 °F (37 °C) 97.2 °F (36.2 °C)   TempSrc:   Temporal Temporal   SpO2: 96% 95% 96%    Weight:       Height:              Intake/Output Summary (Last 24 hours) at 7/31/2022 0835  Last data filed at 7/30/2022 1700  Gross per 24 hour   Intake 600 ml   Output --   Net 600 ml       Physical Exam:     General- in no acute distress   HEENT- normocephalic, atraumatic   Cardiac- regular rate, well perfused   Pulm- unlabored respirations, symmetric chest rise, incision c/d/i  Abd- soft, non-tender, non-distended   Ext- moves all 4   Neuro- no focal deficits  Psych- cooperative     Recent Labs   Lab 07/31/22  0332   WBC 10.8   RBC 3.46*   HGB 9.1*   HCT 28.5*   *       Recent Labs   Lab 07/30/22  0536 07/29/22  0402 07/28/22  0755   SODIUM 139 142 137   POTASSIUM 3.8 4.1 3.9   CHLORIDE 105 110 103   CO2 27 26 28   BUN 7 7 7   CREATININE 0.37* 0.50* 0.43*   GLUCOSE 124* 113* 114*   CALCIUM 8.1* 8.6 8.3*       Imaging:     XR CHEST PA OR AP 1 VIEW  Narrative: EXAMINATION:  XR CHEST PA OR AP 1 VIEW.     INDICATION: Shortness of breath.  Impression: FINDINGS/IMPRESSION:    There are residual right mid lung and basilar infiltrative/arthritic  changes with an associated small right pleural effusion, which appear  similar to the findings observed on the most recent examination of the  preceding day.    Also again noted is residual left basilar subsegmental atelectasis.    There are degenerative changes of the  thoracic spine and bilateral  acromioclavicular joints.  The heart size is normal.  There is tortuosity  of the thoracic aorta.    There is no demonstrable pneumothorax.    Electronically Signed by: LU MONZON MD   Signed on: 2022 9:36 AM   TRANSTHORACIC ECHO (TTE) COMPLETE W/ W/O IMAGING AGENT  *[facility]*  [address] [address]  [phone]  Transthoracic Echocardiogram (TTE)    Patient: Katina Ahuja    Study Date/Time:     2022 1:24PM  MRN:     [id number]             FIN#:                [id number]  :     1971          Ht/Wt:               165.1cm 73.9kg  Age:     51                  BSA/BMI:             1.81m^2 27.1kg/m^2  Gender:  F                   Baseline BP:         91 / 49  Ordering Physician:   Nishant Allen     Referring Physician:  Pam Winter     Attending Physician:  Chelsea Corado     Diagnostic Physician: Adonay Salguero MD  Sonographer:          Hannah Flynn     --------------------------------------------------------------------------  INDICATIONS:   R/o vegetations.    --------------------------------------------------------------------------  STUDY CONCLUSIONS  SUMMARY:    1. Left ventricle: The cavity size is normal. Wall thickness is normal.     The ejection fraction was measured by single plane method of disks. The     ejection fraction is 59%.  2. Right ventricle: The cavity size is normal. Systolic function is     normal.  Recommendations:   Transesophageal echocardiography if clinically  indicated.    --------------------------------------------------------------------------  STUDY DATA:  Patient room number: 5354W.  Procedure:  Transthoracic  echocardiography was performed. Image quality was good.  M-mode, complete  2D, complete spectral Doppler, and color Doppler.  Study status:  Routine.   Study completion:  There were no complications.    FINDINGS    LEFT VENTRICLE:  The cavity size is normal. Wall  thickness is normal.  Systolic function is normal. Wall motion is normal; there are no regional  wall motion abnormalities.    The ejection fraction was measured by single  plane method of disks. The ejection fraction is 59%. Left ventricular  diastolic function parameters are normal.    AORTIC VALVE:  The annulus is normal. The valve is trileaflet. The  leaflets are normal thickness.  Doppler:  Transvalvular velocity is within  the normal range. There is no stenosis.  No regurgitation.    AORTA:  Aortic root: The aortic root is normal in size.  Ascending aorta: The ascending aorta is normal in size.    MITRAL VALVE:  The annulus is normal. The leaflets are normal thickness.  Doppler:  Transvalvular velocity is within the normal range. There is no  evidence for stenosis.  Trivial regurgitation.    The peak diastolic  gradient is 3mm Hg.    LEFT ATRIUM:  The atrium is normal in size.    RIGHT VENTRICLE:  The cavity size is normal. Systolic function is normal.    PULMONIC VALVE:   Not well visualized.  Doppler:  Transvalvular velocity  is within the normal range.  No regurgitation.    TRICUSPID VALVE:  The leaflets are normal thickness.  Doppler:  Transvalvular velocity is within the normal range.  Trivial regurgitation.    RIGHT ATRIUM:  The atrium is normal in size.    PERICARDIUM:  There is no pericardial effusion.    BASELINE ECG:   Normal sinus rhythm.    --------------------------------------------------------------------------  Measurements     Left ventricle                 Value        Ref        Right ventricle             Value        Ref   HEIDI, LAX chord        (L)      3.6   cm     3.8 - 5.2  HEIDI, LAX                    2.9   cm     ---------   ESD, LAX chord                 2.6   cm     2.2 - 3.5   HEIDI/bsa, LAX chord    (L)      2.0   cm/m^2 2.3 - 3.1  Left atrium                 Value        Ref   ESD/bsa, LAX chord             1.4   cm/m^2 1.3 - 2.1  AP dim, ES           (L)    2.6   cm     2.7 - 3.8    PW, ED, LAX           (H)      1.0   cm     0.6 - 0.9  AP dim index         (L)    1.4   cm/m^2 1.5 - 2.3   HEIDI major ax, A4C              7.7   cm     ---------  Area ES, A4C                14    cm^2   <=20   ESD major ax, A4C              6.2   cm     ---------  Area ES, A2C                15    cm^2   ---------   FS major axis, A4C             20    %      ---------  Vol, S                      34    ml     22 - 52   HEIDI/bsa major ax, A4C          4.2   cm/m^2 ---------  Vol/bsa, S                  19    ml/m^2 16 - 34   ESD/bsa major ax, A4C          3.4   cm/m^2 ---------  Vol, ES, 1-p A4C            28    ml     22 - 52   LEENA, A4C                       26.0  cm^2   ---------  Vol/bsa, ES, 1-p A4C        15    ml/m^2 11 - 40   KIANA, A4C                       14.5  cm^2   ---------  Vol, ES, 1-p A2C            39    ml     22 - 52   FAC, A4C                       44    %      ---------  Vol/bsa, ES, 1-p A2C        22    ml/m^2 13 - 40   PW, ED                (H)      1.0   cm     0.6 - 0.9  Vol, ES, 2-p                34    ml     ---------   IVS/PW, ED                     1.11         ---------  Vol/bsa, ES, 2-p            19    ml/m^2 16 - 34   EDV                            48    ml     46 - 106   ESV                            18    ml     14 - 42    Mitral valve                Value        Ref   EF                             59    %      54 - 74    Peak E                      0.83  m/sec  ---------   SV                             31    ml     ---------  Peak A                      0.84  m/sec  ---------   EDV/bsa               (L)      26    ml/m^2 29 - 61    Decel time                  180   ms     ---------   ESV/bsa                        10    ml/m^2 8 - 24     Peak grad, D                3     mm Hg  ---------   SV/bsa                         17    ml/m^2 ---------  Peak E/A ratio              1            ---------   SV, 1-p A4C                    43    ml     ---------   SV/bsa, 1-p A4C                 24    ml/m^2 ---------  Tricuspid valve             Value        Ref   ESV, 2-p                       30    ml     14 - 42    TR peak v                   2.3   m/sec  <=2.8   ESV/bsa, 2-p                   17    ml/m^2 8 - 24     Peak RV-RA grad, S          20    mm Hg  ---------   E', lat yair, TDI               12.3  cm/sec >=10       Max TR alejo                  2.25  m/sec  ---------   E/e', lat yair, TDI             7            ---------   E', med yair, TDI               9.36  cm/sec >=7        Aortic root                 Value        Ref   E/e', med yair, TDI             9            ---------  Root diam, ED               2.5   cm     <4.0   E', avg, TDI                   10.83 cm/sec ---------   E/e', avg, TDI                 8            <=14       Ascending aorta             Value        Ref                                                          AAo AP diam, ED             2.6   cm     1.9 - 3.5   Ventricular septum             Value        Ref        AAo AP diam/bsa, ED         1.4   cm/m^2 1.0 - 2.2   IVS, ED               (H)      1.1   cm     0.6 - 0.9     Legend:  (L)  and  (H)  mirna values outside specified reference range.    Prepared and electronically signed by  Adonay Salguero MD  07/27/2022 15:03        Assessment/Plan:  51 year old female with hx of R empyema now s/p R VATS with drainage of empyema, decortication on 7/23. Tolerated the procedure well.    - Continue multimodal pain control as needed   - Encourage IS use and ambulation  - fluid cx with Strep   - bowel regimen  - regular diet  - SLIV  - continue IV Abx per ID; PICC placed   - TTE w/o vegetations   - social work consulted to assist for approval of o/p IV abx   - ok to dc from thoracic surgery standpoint  - ok to transfer to 4W with telemetry     Discussed w/ senior and attending Dr. Wil eJnsen MD  Thoracic Surgery  PGY-1    Please Perfect serve \"thoracic surgery intern\" for question/concerns regarding patient.  DISPLAY PLAN FREE TEXT

## 2022-08-23 NOTE — PATIENT PROFILE ADULT. - FUNCTIONAL SCREEN CURRENT LEVEL: SWALLOWING (IF SCORE 2 OR MORE FOR ANY ITEM, CONSULT REHAB SERVICES), MLM)
(0) swallows foods/liquids without difficulty Eye Protection Verbiage: Before proceeding with the stage, a plastic scleral shield was inserted. The globe was anesthetized with a few drops of 1% lidocaine with 1:100,000 epinephrine. Then, an appropriate sized scleral shield was chosen and coated with lacrilube ointment. The shield was gently inserted and left in place for the duration of each stage. After the stage was completed, the shield was gently removed.

## 2022-09-25 ENCOUNTER — HEALTH MAINTENANCE LETTER (OUTPATIENT)
Age: 57
End: 2022-09-25

## 2023-03-04 NOTE — ASU PREOP CHECKLIST - AS BP NONINV METHOD
[facility]         HOSPITALIST  DISCHARGE SUMMARY    Patient Name: Shayne Hanley  : 1970  MRN: [id number]    Date of Admission: 2023  Date of Discharge: 3/4/2023  Primary Care Physician: Provider, No Known    Consults     Date and Time Order Name Status Description    2023 10:19 AM Inpatient Gastroenterology Consult Completed     2023  5:03 AM Inpatient Hospitalist Consult            Active and Resolved Hospital Problems:  Active Hospital Problems    Diagnosis POA   • **Sepsis with acute hypoxic respiratory failure (HCC) [A41.9, R65.20, J96.01] Yes   • Iron deficiency anemia [D50.9] Unknown   • Hiatal hernia [K44.9] Unknown   • Community acquired pneumonia [J18.9] Unknown      Resolved Hospital Problems   No resolved problems to display.   Acute hypoxic respiratory failure  Sepsis POA secondary to multifocal pneumonia  Community-acquired pneumonia due to unknown bacterial source, presumed gram-negative  Nonpurulent cellulitis of left lower extremity  Lymphadenopathy of groin  Iron deficiency anemia  Lactic acidosis  Chronic severe microcytic anemia  Questionable blood loss anemia  Melena    Hospital Course     Hospital Course:  Shayne Hanley is a 52 y.o. male presented to the ED with complaints of chills, myalgias, dry cough since last few days that has been progressively worsening.  Associated with shortness of breath.  Septic and hypoxic on admission with evidence of pneumonia.  He was admitted for further care, developed erythema pain in his groin consistent with cellulitis.  Started on broad-spectrum antibiotics and cultures obtained.  Infectious work-up was largely negative but his pneumonia and cellulitis improved drastically with antibiotics.  He was weaned off oxygen prior to discharge.  He did require 1 unit PRBC transfusion but hemoglobin stabilized, he was found to have iron deficiency anemia and started on oral iron replacement.  Gastroenterology was  consulted but deferred endoscopy in setting of current infection stabilization of hemoglobin.  The patient is also self-pay and this also played a role in preferring an outpatient EGD if necessary.  Cellulitis improved and he was able to ambulate about the room the day of discharge.  He was discharged home in stable condition on 3/4/2023 to complete 7 days of antibiotics.  Recommend follow-up with PCP in 1 week, gastroenterology within 1 month.    Day of Discharge     Vital Signs:  Temp:  [97.5 °F (36.4 °C)-98.4 °F (36.9 °C)] 98.4 °F (36.9 °C)  Heart Rate:  [73-92] 84  Resp:  [14-18] 16  BP: (123-140)/(70-87) 129/76  Flow (L/min):  [2] 2  Physical Exam:   Gen: NAD, WDWN  ENT: PERRL, EOMI   CV: RRR no MRG  Pulm: CTAB, no w/r/r  GI: Abd soft, NTND, +bs  Neuro: Moving all extremities spontaneously, CN II-XII grossly intact   Psych: A&O*3, normal mood and affect  Skin: Significantly improved erythema of the groin and left shin      Discharge Details        Discharge Medications      New Medications      Instructions Start Date   amoxicillin-clavulanate 875-125 MG per tablet  Commonly known as: Augmentin   1 tablet, Oral, 2 Times Daily      clobetasol 0.05 % cream  Commonly known as: TEMOVATE   1 application, Topical, Every 12 Hours Scheduled      ferrous sulfate 325 (65 FE) MG tablet   325 mg, Oral, Daily With Breakfast   Start Date: March 5, 2023     pantoprazole 40 MG EC tablet  Commonly known as: PROTONIX   40 mg, Oral, Daily             Allergies   Allergen Reactions   • Penicillins Unknown - Low Severity     Has tolerated Ceftriaxone and Cefepime -Bhupendra Emerson [facility]       Discharge Disposition:  Home or Self Care    Diet:  Hospital:  Diet Order   Procedures   • Diet: Regular/House Diet; Texture: Regular Texture (IDDSI 7); Fluid Consistency: Thin (IDDSI 0)       Discharge Activity:   Activity Instructions     Activity as Tolerated            CODE STATUS:  Code Status and Medical Interventions:   Ordered at:  02/28/23 0550     Level Of Support Discussed With:    Patient     Code Status (Patient has no pulse and is not breathing):    CPR (Attempt to Resuscitate)     Medical Interventions (Patient has pulse or is breathing):    Full Support         No future appointments.    Additional Instructions for the Follow-ups that You Need to Schedule     Discharge Follow-up with PCP   As directed       Currently Documented PCP:    Provider, No Known    PCP Phone Number:    None     Follow Up Details: 3-5 days         Discharge Follow-up with Specified Provider: Gastroenterology; 1 Month   As directed      To: Gastroenterology    Follow Up: 1 Month               Pertinent  and/or Most Recent Results     PROCEDURES:   None    LAB RESULTS:      Lab 03/04/23  0514 03/03/23  0835 03/02/23  0520 03/01/23  0851 03/01/23  0418 03/01/23  0139 02/28/23  2259 02/28/23  1643 02/28/23  0933 02/28/23  0614 02/28/23  0254   WBC 5.72 6.74 7.73 9.93  --   --   --  16.82* 21.18*  --  23.11*   HEMOGLOBIN 8.4* 8.7* 7.7* 7.7*  --   --   --  7.9* 6.9*  --  7.5*   HEMATOCRIT 32.5* 33.3* 29.6* 28.7*  --   --   --  30.0* 26.9*  --  28.9*   PLATELETS 225 225 235 191  --   --   --  194 206  --  259   NEUTROS ABS 3.88 5.15 6.29 8.51*  --   --   --   --  19.62*  --  20.81*   IMMATURE GRANS (ABS) 0.10* 0.06* 0.07* 0.06*  --   --   --   --  0.28*  --  0.61*   LYMPHS ABS 0.69* 0.65* 0.49* 0.50*  --   --   --   --  0.19*  --  0.26*   MONOS ABS 0.55 0.58 0.64 0.77  --   --   --   --  1.04*  --  1.33*   EOS ABS 0.44* 0.24 0.18 0.05  --   --   --   --  0.01  --  0.06   MCV 70.5* 69.7* 69.2* 69.3*  --   --   --  68.5* 66.3*  --  66.1*   SED RATE  --   --   --   --  26*  --   --   --   --   --   --    CRP  --   --   --   --  17.66*  --   --   --   --   --   --    PROCALCITONIN  --   --   --   --  0.59*  --   --   --   --   --  0.33*   LACTATE  --   --   --   --   --   --   --   --   --  1.9 2.8*   LACTATE, ARTERIAL  --   --   --   --   --  0.56  --   --   --   --   --     D DIMER QUANT  --   --   --   --   --   --  0.39  --   --   --   --          Lab 03/04/23  0514 03/03/23  0835 03/02/23  0519 03/01/23  0851 03/01/23  0139 02/28/23  0933 02/28/23  0254   SODIUM 138 138 138 137  --  137 137   SODIUM, ARTERIAL  --   --   --   --  131.9*  --   --    POTASSIUM 4.1 4.1 3.8 4.1  --  4.1 4.2   CHLORIDE 101 101 102 103  --  101 99   CO2 26.9 27.2 26.6 27.0  --  27.5 26.7   ANION GAP 10.1 9.8 9.4 7.0  --  8.5 11.3   BUN 6 6 6 7  --  8 11   CREATININE 0.67* 0.65* 0.66* 0.62*  --  0.65* 0.86   EGFR 111.0 112.0 111.5 113.6  --  112.0 102.9   GLUCOSE 108* 106* 129* 133*  --  131* 152*   GLUCOSE, ARTERIAL  --   --   --   --  110*  --   --    CALCIUM 9.3 9.2 9.1 8.6  --  8.7 9.5   IONIZED CALCIUM  --   --   --   --  1.13  --   --    MAGNESIUM 1.8  --  1.8 1.9  --   --  1.6   PHOSPHORUS 4.8*  --  3.6  --   --   --   --    HEMOGLOBIN A1C  --   --   --   --   --   --  <4.30*   TSH  --   --   --   --   --   --  0.734         Lab 02/28/23  0254   TOTAL PROTEIN 6.5   ALBUMIN 4.3   GLOBULIN 2.2   ALT (SGPT) 40   AST (SGOT) 18   BILIRUBIN 0.7   ALK PHOS 74   LIPASE 50         Lab 02/28/23  0254   PROBNP 120.7   HSTROP T 15*         Lab 02/28/23  0254   CHOLESTEROL 153   LDL CHOL 101*   HDL CHOL 37*   TRIGLYCERIDES 75         Lab 02/28/23  1042 02/28/23  0933 02/28/23  0254   IRON  --   --  13*   IRON SATURATION  --   --  2*   TIBC  --   --  642*   TRANSFERRIN  --   --  431*   FERRITIN  --   --  3.79*   FOLATE  --   --  >20.00   VITAMIN B 12  --   --  637   ABO TYPING O   < >  --    RH TYPING Positive   < >  --    ANTIBODY SCREEN Negative  --   --     < > = values in this interval not displayed.         Lab 03/01/23  0139   PH, ARTERIAL 7.404   PCO2, ARTERIAL 37.2   PO2 ART 85.5   O2 SATURATION ART 95.2   FIO2 28   HCO3 ART 22.7   BASE EXCESS ART -1.7   CARBOXYHEMOGLOBIN 1.6*     Brief Urine Lab Results  (Last result in the past 365 days)      Color   Clarity   Blood   Leuk Est   Nitrite   Protein    CREAT   Urine HCG        02/28/23 0552 Yellow   Clear   Negative   Negative   Negative   Trace               Microbiology Results (last 10 days)     Procedure Component Value - Date/Time    MRSA Screen, PCR (Inpatient) - Swab, Nares [[id number]]  (Normal) Collected: 03/01/23 0813    Lab Status: Final result Specimen: Swab from Nares Updated: 03/01/23 1003     MRSA PCR No MRSA Detected    Narrative:      The negative predictive value of this diagnostic test is high and should only be used to consider de-escalating anti-MRSA therapy. A positive result may indicate colonization with MRSA and must be correlated clinically.    Legionella Antigen, Urine - Urine, Urine, Clean Catch [[id number]]  (Normal) Collected: 02/28/23 0552    Lab Status: Final result Specimen: Urine, Clean Catch Updated: 02/28/23 0700     LEGIONELLA ANTIGEN, URINE Negative    S. Pneumo Ag Urine or CSF - Urine, Urine, Clean Catch [[id number]]  (Normal) Collected: 02/28/23 0552    Lab Status: Final result Specimen: Urine, Clean Catch Updated: 02/28/23 0700     Strep Pneumo Ag Negative    Blood Culture - Blood, Arm, Left [[id number]]  (Normal) Collected: 02/28/23 0352    Lab Status: Preliminary result Specimen: Blood from Arm, Left Updated: 03/04/23 0415     Blood Culture No growth at 4 days    Influenza Antigen, Rapid - Swab, Nasopharynx [[id number]]  (Normal) Collected: 02/28/23 0255    Lab Status: Final result Specimen: Swab from Nasopharynx Updated: 02/28/23 0331     Influenza A Ag, EIA Negative     Influenza B Ag, EIA Negative    COVID-19,APTIMA PANTHER(LESTER),[facility], NP/OP SWAB IN UTM/VTM/[facility] TRANSPORT MEDIA,24 HR TAT - Swab, Nasopharynx [[id number]]  (Normal) Collected: 02/28/23 0255    Lab Status: Final result Specimen: Swab from Nasopharynx Updated: 02/28/23 1431     COVID19 Not Detected    Narrative:      Fact sheet for providers: https://www.fda.gov/media/947626/download     Fact sheet for patients:  electronic https://www.fda.gov/media/820612/download    Test performed by RT PCR.    Blood Culture - Blood, Arm, Left [[id number]]  (Normal) Collected: 02/28/23 0254    Lab Status: Preliminary result Specimen: Blood from Arm, Left Updated: 03/04/23 0315     Blood Culture No growth at 4 days          XR Chest 1 View    Result Date: 2/28/2023  Impression:   1. New bilateral infiltrates are present, which may represent infectious multifocal pneumonia.  Pulmonary edema would be in the differential diagnosis.  2. There is mild cardiomegaly.  3. A large hiatal hernia is suspected.      Please note that portions of this note were completed with a voice recognition program.  SHARON CARBONE JR, MD       Electronically Signed and Approved By: SHARON CARBONE JR, MD on 2/28/2023 at 3:49              CT Lower Extremity Left With Contrast    Result Date: 3/1/2023  Impression:   1. No abscess is seen involving the left lower extremity.  2. Minimal subcutaneous edema is appreciated.  3. No ectopic gas collections.  4. No unintended retained foreign body is appreciated.  5. No acute fracture or acute malalignment.  6. Please see above comments for further detail.   1.   Please note that portions of this note were completed with a voice recognition program.  SHARON CARBONE JR, MD       Electronically Signed and Approved By: SHARON CARBONE JR, MD on 3/01/2023 at 2:07                Results for orders placed during the hospital encounter of 02/28/23    Duplex Venous Lower Extremity - Bilateral CV-READ    Interpretation Summary  •  Left inguinal lymphadenopathy.  •  Chronic left lower extremity superficial thrombophlebitis noted in the small saphenous.  •  Left popliteal fossa fluid collection.  •  All other veins appeared normal bilaterally.      Results for orders placed during the hospital encounter of 02/28/23    Duplex Venous Lower Extremity - Bilateral CV-READ    Interpretation Summary  •  Left inguinal lymphadenopathy.  •  Chronic left lower  extremity superficial thrombophlebitis noted in the small saphenous.  •  Left popliteal fossa fluid collection.  •  All other veins appeared normal bilaterally.      Results for orders placed during the hospital encounter of 02/28/23    Adult Transthoracic Echo Complete W/ Cont if Necessary Per Protocol    Interpretation Summary  •  Left ventricular systolic function is normal. Calculated left ventricular EF = 65%  •  Left ventricular diastolic function is consistent with (grade I) impaired relaxation.      Labs Pending at Discharge:  Pending Labs     Order Current Status    Blood Culture - Blood, Arm, Left Preliminary result    Blood Culture - Blood, Arm, Left Preliminary result            Time spent on Discharge including face to face service:  34 minutes    Electronically signed by Branden Leiva MD, 03/04/23, 9:01 AM EST.

## 2023-10-14 ENCOUNTER — HEALTH MAINTENANCE LETTER (OUTPATIENT)
Age: 58
End: 2023-10-14

## 2024-01-17 NOTE — PATIENT PROFILE ADULT. - MEDICATION, ABILITY TO FOLLOW SCHEDULE, PROFILE
Action Requested: Summary for Provider     []  Critical Lab, Recommendations Needed  [] Need Additional Advice  []   FYI    []   Need Orders  [] Need Medications Sent to Pharmacy  []  Other     SUMMARY: Patient reports heavy menses with blood clots, starting in 2023.  Has gone to a gynecologist and is on week 2 of pills to regulate her periods, not sure of name of medication,  but does not want to follow up with that provider.  Today has been changing pads every 1.5 hours and had to leave work.  Feels tired but denies dizziness or shortness of breath.  Wants to be seen.  Denies abdominal pain.  Appointment scheduled. Patient was advised to bring medication prescribed by the outside GYN to her appointment.    Future Appointments   Date Time Provider Department Center   2024  2:40 PM Teddy Naik MD [facility]     Reason for call: Acute  Heavy periods with clots.  Onset: Ongoing since 2023.    Spoke with patient,  verified.     Reason for Disposition   Patient wants to be seen    Protocols used: Vaginal Bleeding - [id number]    
no

## 2024-08-25 NOTE — OCCUPATIONAL THERAPY INITIAL EVALUATION ADULT - SITTING BALANCE: STATIC
Anesthesia Post-op Note    Patient: Jarocho Sheikh  Procedure(s) Performed: COLONOSCOPY  EGD (ESOPHAGOGASTRODUODENOSCOPY)   Anesthesia type: MAC    Vitals Value Taken Time   Temp 36.6 °C (97.8 °F) 08/25/24 1215   Pulse 78 08/25/24 1225   Resp 18 08/25/24 1225   SpO2 100 % 08/25/24 1225   /56 08/25/24 1225         Patient Location: PACU Phase 1  Post-op Vital Signs:stable  Level of Consciousness: participates in exam, awake, oriented, alert and answers questions appropriately  Respiratory Status: spontaneous ventilation, unassisted and room air  Cardiovascular blood pressure returned to baseline  Hydration: euvolemic  Pain Management: adequately controlled  Nausea: None  Airway Patency:patent  Post-op Assessment: awake, alert, appropriately conversant, or baseline, no complications and patient tolerated procedure well      No notable events documented.                      
good balance